# Patient Record
Sex: FEMALE | Race: WHITE | NOT HISPANIC OR LATINO | Employment: OTHER | URBAN - METROPOLITAN AREA
[De-identification: names, ages, dates, MRNs, and addresses within clinical notes are randomized per-mention and may not be internally consistent; named-entity substitution may affect disease eponyms.]

---

## 2017-12-12 DIAGNOSIS — Z12.31 SCREENING MAMMOGRAM, ENCOUNTER FOR: Primary | ICD-10-CM

## 2017-12-15 ENCOUNTER — HOSPITAL ENCOUNTER (OUTPATIENT)
Dept: RADIOLOGY | Facility: HOSPITAL | Age: 72
Discharge: HOME | End: 2017-12-15
Attending: FAMILY MEDICINE
Payer: MEDICARE

## 2017-12-15 DIAGNOSIS — Z12.31 SCREENING MAMMOGRAM, ENCOUNTER FOR: ICD-10-CM

## 2017-12-15 PROCEDURE — 77067 SCR MAMMO BI INCL CAD: CPT | Mod: 26,,, | Performed by: RADIOLOGY

## 2017-12-15 PROCEDURE — 77063 BREAST TOMOSYNTHESIS BI: CPT | Mod: 26,,, | Performed by: RADIOLOGY

## 2017-12-15 PROCEDURE — 77067 SCR MAMMO BI INCL CAD: CPT | Mod: TC

## 2018-09-27 ENCOUNTER — HOSPITAL ENCOUNTER (OUTPATIENT)
Dept: RADIOLOGY | Facility: HOSPITAL | Age: 73
Discharge: HOME | End: 2018-09-27
Attending: FAMILY MEDICINE
Payer: MEDICARE

## 2018-09-27 DIAGNOSIS — M25.571 RIGHT ANKLE PAIN: Primary | ICD-10-CM

## 2018-09-27 DIAGNOSIS — M79.671 RIGHT FOOT PAIN: ICD-10-CM

## 2018-09-27 DIAGNOSIS — M79.671 RIGHT FOOT PAIN: Primary | ICD-10-CM

## 2018-09-27 DIAGNOSIS — M25.571 RIGHT ANKLE PAIN: ICD-10-CM

## 2018-09-27 PROCEDURE — 73630 X-RAY EXAM OF FOOT: CPT | Mod: TC,FY,RT

## 2018-09-27 PROCEDURE — 73610 X-RAY EXAM OF ANKLE: CPT | Mod: TC,FY,RT

## 2018-09-27 PROCEDURE — 73610 X-RAY EXAM OF ANKLE: CPT | Mod: 26,RT,, | Performed by: RADIOLOGY

## 2018-09-27 PROCEDURE — 73630 X-RAY EXAM OF FOOT: CPT | Mod: 26,RT,, | Performed by: RADIOLOGY

## 2019-01-08 DIAGNOSIS — Z12.39 SCREENING BREAST EXAMINATION: Primary | ICD-10-CM

## 2019-01-10 ENCOUNTER — HOSPITAL ENCOUNTER (OUTPATIENT)
Dept: RADIOLOGY | Facility: HOSPITAL | Age: 74
Discharge: HOME | End: 2019-01-10
Attending: FAMILY MEDICINE
Payer: MEDICARE

## 2019-01-10 DIAGNOSIS — Z12.39 SCREENING BREAST EXAMINATION: ICD-10-CM

## 2019-01-10 PROCEDURE — 77067 SCR MAMMO BI INCL CAD: CPT | Mod: 26,,, | Performed by: RADIOLOGY

## 2019-01-10 PROCEDURE — 77067 MAMMO DIGITAL SCREENING BILAT WITH TOMOSYNTHESIS_CAD: ICD-10-PCS | Mod: 26,,, | Performed by: RADIOLOGY

## 2019-01-10 PROCEDURE — 77067 SCR MAMMO BI INCL CAD: CPT | Mod: TC

## 2019-01-10 PROCEDURE — 77063 BREAST TOMOSYNTHESIS BI: CPT | Mod: 26,,, | Performed by: RADIOLOGY

## 2019-01-10 PROCEDURE — 77063 MAMMO DIGITAL SCREENING BILAT WITH TOMOSYNTHESIS_CAD: ICD-10-PCS | Mod: 26,,, | Performed by: RADIOLOGY

## 2019-01-18 ENCOUNTER — HOSPITAL ENCOUNTER (OUTPATIENT)
Dept: RADIOLOGY | Facility: HOSPITAL | Age: 74
Discharge: HOME | End: 2019-01-18
Attending: PODIATRIST
Payer: MEDICARE

## 2019-01-18 ENCOUNTER — OFFICE VISIT (OUTPATIENT)
Dept: PODIATRY | Facility: CLINIC | Age: 74
End: 2019-01-18
Payer: MEDICARE

## 2019-01-18 VITALS
WEIGHT: 158 LBS | HEIGHT: 61 IN | DIASTOLIC BLOOD PRESSURE: 81 MMHG | HEART RATE: 79 BPM | BODY MASS INDEX: 29.83 KG/M2 | SYSTOLIC BLOOD PRESSURE: 133 MMHG

## 2019-01-18 DIAGNOSIS — M79.671 CHRONIC FOOT PAIN, RIGHT: Primary | ICD-10-CM

## 2019-01-18 DIAGNOSIS — M76.71 PERONEAL TENDONITIS, RIGHT: ICD-10-CM

## 2019-01-18 DIAGNOSIS — G89.29 CHRONIC FOOT PAIN, RIGHT: Primary | ICD-10-CM

## 2019-01-18 DIAGNOSIS — M77.41 METATARSALGIA OF RIGHT FOOT: ICD-10-CM

## 2019-01-18 DIAGNOSIS — M79.671 CHRONIC FOOT PAIN, RIGHT: ICD-10-CM

## 2019-01-18 DIAGNOSIS — G89.29 CHRONIC FOOT PAIN, RIGHT: ICD-10-CM

## 2019-01-18 PROBLEM — E03.9 HYPOTHYROIDISM: Status: ACTIVE | Noted: 2019-01-18

## 2019-01-18 PROBLEM — I10 HYPERTENSION: Status: ACTIVE | Noted: 2019-01-18

## 2019-01-18 PROCEDURE — 99203 OFFICE O/P NEW LOW 30 MIN: CPT | Mod: S$GLB,,, | Performed by: PODIATRIST

## 2019-01-18 PROCEDURE — 1101F PT FALLS ASSESS-DOCD LE1/YR: CPT | Mod: CPTII,S$GLB,, | Performed by: PODIATRIST

## 2019-01-18 PROCEDURE — 1101F PR PT FALLS ASSESS DOC 0-1 FALLS W/OUT INJ PAST YR: ICD-10-PCS | Mod: CPTII,S$GLB,, | Performed by: PODIATRIST

## 2019-01-18 PROCEDURE — 73630 X-RAY EXAM OF FOOT: CPT | Mod: TC,PN,RT

## 2019-01-18 PROCEDURE — 73630 X-RAY EXAM OF FOOT: CPT | Mod: 26,RT,, | Performed by: RADIOLOGY

## 2019-01-18 PROCEDURE — 73630 XR FOOT COMPLETE 3 VIEW RIGHT: ICD-10-PCS | Mod: 26,RT,, | Performed by: RADIOLOGY

## 2019-01-18 PROCEDURE — 99999 PR PBB SHADOW E&M-EST. PATIENT-LVL IV: CPT | Mod: PBBFAC,,, | Performed by: PODIATRIST

## 2019-01-18 PROCEDURE — 99999 PR PBB SHADOW E&M-EST. PATIENT-LVL IV: ICD-10-PCS | Mod: PBBFAC,,, | Performed by: PODIATRIST

## 2019-01-18 PROCEDURE — 99203 PR OFFICE/OUTPT VISIT, NEW, LEVL III, 30-44 MIN: ICD-10-PCS | Mod: S$GLB,,, | Performed by: PODIATRIST

## 2019-01-18 RX ORDER — MELOXICAM 15 MG/1
15 TABLET ORAL DAILY
Qty: 30 TABLET | Refills: 1 | Status: SHIPPED | OUTPATIENT
Start: 2019-01-18 | End: 2020-07-13

## 2019-01-18 NOTE — PROGRESS NOTES
"Subjective:      Patient ID: Agnieszka Trujillo is a 73 y.o. female.    Chief Complaint: Foot Pain (right foot )    Agnieszka Trujillo is a 73 y.o. female who  has a past medical history of Hypertension and Thyroid disease.    Patient complaining of right foot pain.  Points to right 5th met head and her fore foot.  States a year ago she fell and hit her right foot, and has pain ever since.  Complains of 10/10 achy pain is constant all day,a nd will hurt her at night too.  Reports some pins and tingling to toes 1 and 2.  Denies radiating pain.   States she will take an occasional tylenol or alieve for pain.    Vitals:    01/18/19 0757   BP: 133/81   Pulse: 79   Weight: 71.7 kg (158 lb)   Height: 5' 1" (1.549 m)   PainSc:   9      Past Medical History:   Diagnosis Date    Hypertension     Thyroid disease        Past Surgical History:   Procedure Laterality Date    BREAST BIOPSY Right     x2    HYSTERECTOMY      @28yrs of age    OOPHORECTOMY      @58yrs of age       Family History   Problem Relation Age of Onset    Diabetes Maternal Grandmother        Social History     Socioeconomic History    Marital status:      Spouse name: Not on file    Number of children: Not on file    Years of education: Not on file    Highest education level: Not on file   Social Needs    Financial resource strain: Not on file    Food insecurity - worry: Not on file    Food insecurity - inability: Not on file    Transportation needs - medical: Not on file    Transportation needs - non-medical: Not on file   Occupational History    Not on file   Tobacco Use    Smoking status: Never Smoker   Substance and Sexual Activity    Alcohol use: Yes     Alcohol/week: 0.0 oz    Drug use: No    Sexual activity: Not on file   Other Topics Concern    Not on file   Social History Narrative    Not on file       Current Outpatient Medications   Medication Sig Dispense Refill    aspirin 81 MG Chew Take 81 mg by mouth once daily.      " citalopram (CELEXA) 10 MG tablet       cyanocobalamin (VITAMIN B-12) 500 MCG tablet Take 500 mcg by mouth once daily.      desonide 0.05% (DESOWEN) 0.05 % Oint       fish oil-omega-3 fatty acids 300-1,000 mg capsule Take 2 g by mouth once daily.      levothyroxine (SYNTHROID) 100 MCG tablet Take 1 tablet by mouth once daily.      lisinopril-hydrochlorothiazide (PRINZIDE,ZESTORETIC) 20-25 mg Tab Take 1 tablet by mouth once daily.      meloxicam (MOBIC) 15 MG tablet Take 1 tablet (15 mg total) by mouth once daily. 30 tablet 1    simvastatin (ZOCOR) 40 MG tablet Take 1 tablet by mouth once daily.      trazodone (DESYREL) 100 MG tablet Take 1 tablet by mouth once daily.      vitamin D 1000 units Tab Take 185 mg by mouth once daily.      vitamin E 400 UNIT capsule Take 400 Units by mouth once daily.       No current facility-administered medications for this visit.        Review of patient's allergies indicates:  No Known Allergies        Review of Systems   Constitution: Negative for chills and fever.   HENT: Negative for congestion.    Cardiovascular: Negative for chest pain and cyanosis.   Respiratory: Negative for cough and shortness of breath.    Skin: Negative for color change, dry skin, poor wound healing, suspicious lesions and unusual hair distribution.   Musculoskeletal: Positive for falls and myalgias. Negative for joint pain, joint swelling and muscle weakness.   Gastrointestinal: Negative for nausea and vomiting.   Neurological: Positive for paresthesias. Negative for loss of balance, numbness, sensory change and tremors.   Psychiatric/Behavioral: Negative for altered mental status. The patient is not nervous/anxious.            Objective:      Physical Exam   Constitutional: She is oriented to person, place, and time. She appears well-developed and well-nourished. No distress.   HENT:   Head: Atraumatic.   Neck: No tracheal deviation present.   Cardiovascular: Intact distal pulses.   Pulses:        Dorsalis pedis pulses are 2+ on the right side, and 2+ on the left side.        Posterior tibial pulses are 2+ on the right side, and 2+ on the left side.   CFT< 3 secs all toes bilateral foot, skin temp warm bilateral foot, diminished digital hair growth bilateral foot, no lower extremity edema bilateral.     Musculoskeletal: She exhibits tenderness. She exhibits no edema or deformity.   Pain at right 5th met base, along peroneal tendons laterally.  Pain at 4th met head and intermetatarsal spaces.  Pain with forced ABDuction of foot.    Mild callapse of medial arch    Mild halluv valgus with medial prominance.    Otherwise, Normal angle, base, station of gait.  No pain to palpation bilateral lower extremities.  Range of motion, stability, muscle strength, and muscle tone age and health appropriate normal bilateral feet and legs.   Neurological: She is alert and oriented to person, place, and time. She has normal strength. She displays no atrophy. No sensory deficit.   Reflex Scores:       Patellar reflexes are 2+ on the right side and 2+ on the left side.       Achilles reflexes are 2+ on the right side and 2+ on the left side.  Negative tinels sign.  5/5 muscle strength.    Skin: Skin is warm, dry and intact. Capillary refill takes less than 2 seconds. No ecchymosis and no rash noted. She is not diaphoretic. No cyanosis or erythema. No pallor. Nails show no clubbing.   Otherwise no open wounds, interdigital macerations, or calluses.  .       Psychiatric: She has a normal mood and affect. Her behavior is normal.             Assessment:       Encounter Diagnoses   Name Primary?    Chronic foot pain, right Yes    Peroneal tendonitis, right     Metatarsalgia of right foot          Plan:       Agnieszka was seen today for foot pain.    Diagnoses and all orders for this visit:    Chronic foot pain, right  -     MRI Foot (Forefoot) Right Without Contrast; Future  -     X-Ray Foot Complete Right; Future    Peroneal  tendonitis, right  -     MRI Foot (Forefoot) Right Without Contrast; Future    Metatarsalgia of right foot  -     MRI Foot (Forefoot) Right Without Contrast; Future  -     X-Ray Foot Complete Right; Future    Other orders  -     meloxicam (MOBIC) 15 MG tablet; Take 1 tablet (15 mg total) by mouth once daily.      I counseled the patient on her conditions, their implications and medical management.    Dispensed short CAM boot to offload the foot. Patient related improvement with pain.    Ordered repeat x-ray and MRI to rule out stress fracture of fifth metatarsal.    Meloxicam for pain    RTC as specified    Assisted by Chay Ge, PGY2      I have personally taken the history and examined this patient and agree with the resident's note as stated as above.   Ramy Mcclellan DPM, FACFAS

## 2019-01-25 ENCOUNTER — HOSPITAL ENCOUNTER (OUTPATIENT)
Dept: RADIOLOGY | Facility: HOSPITAL | Age: 74
Discharge: HOME | End: 2019-01-25
Attending: PODIATRIST
Payer: MEDICARE

## 2019-01-25 DIAGNOSIS — M76.71 PERONEAL TENDONITIS, RIGHT: ICD-10-CM

## 2019-01-25 DIAGNOSIS — M79.671 CHRONIC FOOT PAIN, RIGHT: ICD-10-CM

## 2019-01-25 DIAGNOSIS — M77.41 METATARSALGIA OF RIGHT FOOT: ICD-10-CM

## 2019-01-25 DIAGNOSIS — G89.29 CHRONIC FOOT PAIN, RIGHT: ICD-10-CM

## 2019-01-25 PROCEDURE — 73718 MRI FOOT (FOREFOOT) RIGHT WITHOUT CONTRAST: ICD-10-PCS | Mod: 26,RT,, | Performed by: RADIOLOGY

## 2019-01-25 PROCEDURE — 73718 MRI LOWER EXTREMITY W/O DYE: CPT | Mod: TC,RT

## 2019-01-25 PROCEDURE — 73718 MRI LOWER EXTREMITY W/O DYE: CPT | Mod: 26,RT,, | Performed by: RADIOLOGY

## 2019-02-18 ENCOUNTER — OFFICE VISIT (OUTPATIENT)
Dept: PODIATRY | Facility: CLINIC | Age: 74
End: 2019-02-18
Payer: MEDICARE

## 2019-02-18 VITALS
HEIGHT: 61 IN | HEART RATE: 87 BPM | SYSTOLIC BLOOD PRESSURE: 124 MMHG | DIASTOLIC BLOOD PRESSURE: 72 MMHG | WEIGHT: 158 LBS | BODY MASS INDEX: 29.83 KG/M2

## 2019-02-18 DIAGNOSIS — G89.29 CHRONIC FOOT PAIN, RIGHT: Primary | ICD-10-CM

## 2019-02-18 DIAGNOSIS — G57.61 MORTON'S NEUROMA OF THIRD INTERSPACE OF RIGHT FOOT: ICD-10-CM

## 2019-02-18 DIAGNOSIS — M67.88 PERONEAL TENDINOSIS, RIGHT: ICD-10-CM

## 2019-02-18 DIAGNOSIS — M79.671 CHRONIC FOOT PAIN, RIGHT: Primary | ICD-10-CM

## 2019-02-18 DIAGNOSIS — M77.41 METATARSALGIA OF RIGHT FOOT: ICD-10-CM

## 2019-02-18 PROCEDURE — 99999 PR PBB SHADOW E&M-EST. PATIENT-LVL III: CPT | Mod: PBBFAC,,, | Performed by: PODIATRIST

## 2019-02-18 PROCEDURE — 99213 OFFICE O/P EST LOW 20 MIN: CPT | Mod: 25,S$GLB,, | Performed by: PODIATRIST

## 2019-02-18 PROCEDURE — 99213 PR OFFICE/OUTPT VISIT, EST, LEVL III, 20-29 MIN: ICD-10-PCS | Mod: 25,S$GLB,, | Performed by: PODIATRIST

## 2019-02-18 PROCEDURE — 64455 NJX AA&/STRD PLTR COM DG NRV: CPT | Mod: RT,S$GLB,, | Performed by: PODIATRIST

## 2019-02-18 PROCEDURE — 1101F PR PT FALLS ASSESS DOC 0-1 FALLS W/OUT INJ PAST YR: ICD-10-PCS | Mod: CPTII,S$GLB,, | Performed by: PODIATRIST

## 2019-02-18 PROCEDURE — 1101F PT FALLS ASSESS-DOCD LE1/YR: CPT | Mod: CPTII,S$GLB,, | Performed by: PODIATRIST

## 2019-02-18 PROCEDURE — 64455 PR INJECT ANES/STEROID PLANTAR COMMON DIGITAL NERVE: ICD-10-PCS | Mod: RT,S$GLB,, | Performed by: PODIATRIST

## 2019-02-18 PROCEDURE — 99999 PR PBB SHADOW E&M-EST. PATIENT-LVL III: ICD-10-PCS | Mod: PBBFAC,,, | Performed by: PODIATRIST

## 2019-02-18 RX ORDER — LOSARTAN POTASSIUM AND HYDROCHLOROTHIAZIDE 25; 100 MG/1; MG/1
TABLET ORAL
COMMUNITY
Start: 2018-12-24 | End: 2020-07-21

## 2019-02-18 RX ORDER — METHYLPREDNISOLONE ACETATE 40 MG/ML
40 INJECTION, SUSPENSION INTRA-ARTICULAR; INTRALESIONAL; INTRAMUSCULAR; SOFT TISSUE
Status: COMPLETED | OUTPATIENT
Start: 2019-02-18 | End: 2019-02-18

## 2019-02-18 RX ADMIN — METHYLPREDNISOLONE ACETATE 40 MG: 40 INJECTION, SUSPENSION INTRA-ARTICULAR; INTRALESIONAL; INTRAMUSCULAR; SOFT TISSUE at 11:02

## 2019-02-18 NOTE — PROGRESS NOTES
"Subjective:      Patient ID: Agnieszka Trujillo is a 73 y.o. female.    Chief Complaint: Follow-up    Agnieszka Trujillo is a 73 y.o. female who  has a past medical history of Hypertension and Thyroid disease.    Patient complaining of right foot pain.  Points to right 5th met head and her fore foot.  States a year ago she fell and hit her right foot, and has pain ever since.  Complains of 10/10 achy pain is constant all day,a nd will hurt her at night too.  Reports some pins and tingling to toes 1 and 2.  Denies radiating pain.   States she will take an occasional tylenol or alieve for pain.    2/18/19 Patient f/u right lateral foot pain.  states she has been wearing her boot which helps a lot, admits to wearing reagular shoes to [employer].  Still complaining of pain.  Denies trips, falls, or trauma. Overall pain has improved somewhat with wearing the boot.    Vitals:    02/18/19 1101   BP: 124/72   Pulse: 87   Weight: 71.7 kg (158 lb)   Height: 5' 1" (1.549 m)   PainSc: 0-No pain      Past Medical History:   Diagnosis Date    Hypertension     Thyroid disease        Past Surgical History:   Procedure Laterality Date    BREAST BIOPSY Right     x2    HYSTERECTOMY      @28yrs of age    OOPHORECTOMY      @58yrs of age       Family History   Problem Relation Age of Onset    Diabetes Maternal Grandmother        Social History     Socioeconomic History    Marital status:      Spouse name: None    Number of children: None    Years of education: None    Highest education level: None   Social Needs    Financial resource strain: None    Food insecurity - worry: None    Food insecurity - inability: None    Transportation needs - medical: None    Transportation needs - non-medical: None   Occupational History    None   Tobacco Use    Smoking status: Never Smoker   Substance and Sexual Activity    Alcohol use: Yes     Alcohol/week: 0.0 oz    Drug use: No    Sexual activity: None   Other Topics Concern    None "   Social History Narrative    None       Current Outpatient Medications   Medication Sig Dispense Refill    aspirin 81 MG Chew Take 81 mg by mouth once daily.      citalopram (CELEXA) 10 MG tablet       cyanocobalamin (VITAMIN B-12) 500 MCG tablet Take 500 mcg by mouth once daily.      desonide 0.05% (DESOWEN) 0.05 % Oint       fish oil-omega-3 fatty acids 300-1,000 mg capsule Take 2 g by mouth once daily.      levothyroxine (SYNTHROID) 100 MCG tablet Take 1 tablet by mouth once daily.      lisinopril-hydrochlorothiazide (PRINZIDE,ZESTORETIC) 20-25 mg Tab Take 1 tablet by mouth once daily.      losartan-hydrochlorothiazide 100-25 mg (HYZAAR) 100-25 mg per tablet       meloxicam (MOBIC) 15 MG tablet Take 1 tablet (15 mg total) by mouth once daily. 30 tablet 1    simvastatin (ZOCOR) 40 MG tablet Take 1 tablet by mouth once daily.      trazodone (DESYREL) 100 MG tablet Take 1 tablet by mouth once daily.      vitamin D 1000 units Tab Take 185 mg by mouth once daily.      vitamin E 400 UNIT capsule Take 400 Units by mouth once daily.       No current facility-administered medications for this visit.        Review of patient's allergies indicates:  No Known Allergies        Review of Systems   Constitution: Negative for chills and fever.   HENT: Negative for congestion.    Cardiovascular: Negative for chest pain and cyanosis.   Respiratory: Negative for cough and shortness of breath.    Skin: Negative for color change, dry skin, poor wound healing, suspicious lesions and unusual hair distribution.   Musculoskeletal: Positive for falls and myalgias. Negative for joint pain, joint swelling and muscle weakness.   Gastrointestinal: Negative for nausea and vomiting.   Neurological: Positive for paresthesias. Negative for loss of balance, numbness, sensory change and tremors.   Psychiatric/Behavioral: Negative for altered mental status. The patient is not nervous/anxious.            Objective:      Physical Exam    Constitutional: She is oriented to person, place, and time. She appears well-developed and well-nourished. No distress.   HENT:   Head: Atraumatic.   Neck: No tracheal deviation present.   Cardiovascular: Intact distal pulses.   Pulses:       Dorsalis pedis pulses are 2+ on the right side, and 2+ on the left side.        Posterior tibial pulses are 2+ on the right side, and 2+ on the left side.   CFT< 3 secs all toes bilateral foot, skin temp warm bilateral foot, diminished digital hair growth bilateral foot, no lower extremity edema bilateral.     Musculoskeletal: She exhibits tenderness. She exhibits no edema or deformity.   Pain at right 5th met base, and metatarsal shaft, along peroneal tendons laterally.  Pain at 4th met head and intermetatarsal spaces 3,4 right foot.  Pain with forced ABDuction of foot.    Mild halluv valgus with medial prominance.    FF varus with excessive supination   Neurological: She is alert and oriented to person, place, and time. She has normal strength. She displays no atrophy. No sensory deficit.   Reflex Scores:       Patellar reflexes are 2+ on the right side and 2+ on the left side.       Achilles reflexes are 2+ on the right side and 2+ on the left side.  Negative tinels sign.  5/5 muscle strength.    Skin: Skin is warm, dry and intact. Capillary refill takes less than 2 seconds. No ecchymosis and no rash noted. She is not diaphoretic. No cyanosis or erythema. No pallor. Nails show no clubbing.   Otherwise no open wounds, interdigital macerations, or calluses.  .       Psychiatric: She has a normal mood and affect. Her behavior is normal.             Assessment:       Encounter Diagnoses   Name Primary?   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    Discussed x-ray and MRI results with patient.  Imaging negative for fracutre, positive for edema in peroneal tendons.    -After obtaining verbal consent, the Right 3 rd and fourth distal metatarsal interspaces were prepped and an injection of 1mL 0.5%Marcaine plaine, 40mg Methypredisolone,    was given. Patient instructed to note duration, location and quality of the pain following injection.    -WBAT in boot, patient educated not to bear weight without it    RTC as specified    Assisted by Chay Ge, PGY2    I have personally taken the history and examined this patient and agree with the resident's note as stated as above.   Ramy Mcclellan DPM, FACFAS    Reviewed MRI noting peroneal tendinosis, no fracture or dislocation noted.    Clinically patient has lateral column overload secondary to only partially compensated FF varus right foot. Continue offloading with orthopedic boot.

## 2019-03-22 ENCOUNTER — OFFICE VISIT (OUTPATIENT)
Dept: PODIATRY | Facility: CLINIC | Age: 74
End: 2019-03-22
Payer: MEDICARE

## 2019-03-22 VITALS — HEIGHT: 61 IN | BODY MASS INDEX: 29.83 KG/M2 | WEIGHT: 158 LBS

## 2019-03-22 DIAGNOSIS — M79.671 CHRONIC FOOT PAIN, RIGHT: Primary | ICD-10-CM

## 2019-03-22 DIAGNOSIS — M77.41 METATARSALGIA OF RIGHT FOOT: ICD-10-CM

## 2019-03-22 DIAGNOSIS — M67.88 PERONEAL TENDINOSIS, RIGHT: ICD-10-CM

## 2019-03-22 DIAGNOSIS — G89.29 CHRONIC FOOT PAIN, RIGHT: Primary | ICD-10-CM

## 2019-03-22 PROCEDURE — 99999 PR PBB SHADOW E&M-EST. PATIENT-LVL III: CPT | Mod: PBBFAC,,, | Performed by: PODIATRIST

## 2019-03-22 PROCEDURE — 99999 PR PBB SHADOW E&M-EST. PATIENT-LVL III: ICD-10-PCS | Mod: PBBFAC,,, | Performed by: PODIATRIST

## 2019-03-22 PROCEDURE — 1101F PR PT FALLS ASSESS DOC 0-1 FALLS W/OUT INJ PAST YR: ICD-10-PCS | Mod: CPTII,S$GLB,, | Performed by: PODIATRIST

## 2019-03-22 PROCEDURE — 99213 OFFICE O/P EST LOW 20 MIN: CPT | Mod: 25,S$GLB,, | Performed by: PODIATRIST

## 2019-03-22 PROCEDURE — 99213 PR OFFICE/OUTPT VISIT, EST, LEVL III, 20-29 MIN: ICD-10-PCS | Mod: 25,S$GLB,, | Performed by: PODIATRIST

## 2019-03-22 PROCEDURE — 20550 NJX 1 TENDON SHEATH/LIGAMENT: CPT | Mod: RT,S$GLB,, | Performed by: PODIATRIST

## 2019-03-22 PROCEDURE — 20550 PR INJECT TENDON SHEATH/LIGAMENT: ICD-10-PCS | Mod: RT,S$GLB,, | Performed by: PODIATRIST

## 2019-03-22 PROCEDURE — 1101F PT FALLS ASSESS-DOCD LE1/YR: CPT | Mod: CPTII,S$GLB,, | Performed by: PODIATRIST

## 2019-03-22 RX ORDER — METHYLPREDNISOLONE ACETATE 40 MG/ML
40 INJECTION, SUSPENSION INTRA-ARTICULAR; INTRALESIONAL; INTRAMUSCULAR; SOFT TISSUE
Status: COMPLETED | OUTPATIENT
Start: 2019-03-22 | End: 2019-03-22

## 2019-03-22 RX ADMIN — METHYLPREDNISOLONE ACETATE 40 MG: 40 INJECTION, SUSPENSION INTRA-ARTICULAR; INTRALESIONAL; INTRAMUSCULAR; SOFT TISSUE at 10:03

## 2019-03-23 NOTE — PROGRESS NOTES
"Subjective:      Patient ID: Agnieszka Trujillo is a 73 y.o. female.    Chief Complaint: Follow-up (right foot pain )    Agnieszka Trujillo is a 73 y.o. female who  has a past medical history of Hypertension and Thyroid disease.    Patient complaining of right foot pain.  Points to right 5th met head and her fore foot.  States a year ago she fell and hit her right foot, and has pain ever since.  Complains of 10/10 achy pain is constant all day,a nd will hurt her at night too.  Reports some pins and tingling to toes 1 and 2.  Denies radiating pain.   States she will take an occasional tylenol or alieve for pain.    2/18/19 Patient f/u right lateral foot pain.  states she has been wearing her boot which helps a lot, admits to wearing reagular shoes to [employer].  Still complaining of pain.  Denies trips, falls, or trauma. Overall pain has improved somewhat with wearing the boot.    3/22/19:  Follow-up visit for right foot and ankle pain offloaded in Cam Walker boot.  Says her pain is much better with the boot however when she tries to walk without it she has moderate pain.  No longer has any pain under the ball of foot after the steroid injection.  Point severe dorsal lateral midfoot indicating region of most pain.    Vitals:    03/22/19 0940   Weight: 71.7 kg (158 lb)   Height: 5' 1" (1.549 m)   PainSc:   8      Past Medical History:   Diagnosis Date    Hypertension     Thyroid disease        Past Surgical History:   Procedure Laterality Date    BREAST BIOPSY Right     x2    HYSTERECTOMY      @28yrs of age    OOPHORECTOMY      @58yrs of age       Family History   Problem Relation Age of Onset    Diabetes Maternal Grandmother        Social History     Socioeconomic History    Marital status:      Spouse name: Not on file    Number of children: Not on file    Years of education: Not on file    Highest education level: Not on file   Social Needs    Financial resource strain: Not on file    Food insecurity - worry: " Not on file    Food insecurity - inability: Not on file    Transportation needs - medical: Not on file    Transportation needs - non-medical: Not on file   Occupational History    Not on file   Tobacco Use    Smoking status: Never Smoker   Substance and Sexual Activity    Alcohol use: Yes     Alcohol/week: 0.0 oz    Drug use: No    Sexual activity: Not on file   Other Topics Concern    Not on file   Social History Narrative    Not on file       Current Outpatient Medications   Medication Sig Dispense Refill    aspirin 81 MG Chew Take 81 mg by mouth once daily.      citalopram (CELEXA) 10 MG tablet       cyanocobalamin (VITAMIN B-12) 500 MCG tablet Take 500 mcg by mouth once daily.      desonide 0.05% (DESOWEN) 0.05 % Oint       fish oil-omega-3 fatty acids 300-1,000 mg capsule Take 2 g by mouth once daily.      levothyroxine (SYNTHROID) 100 MCG tablet Take 1 tablet by mouth once daily.      lisinopril-hydrochlorothiazide (PRINZIDE,ZESTORETIC) 20-25 mg Tab Take 1 tablet by mouth once daily.      losartan-hydrochlorothiazide 100-25 mg (HYZAAR) 100-25 mg per tablet       meloxicam (MOBIC) 15 MG tablet Take 1 tablet (15 mg total) by mouth once daily. 30 tablet 1    simvastatin (ZOCOR) 40 MG tablet Take 1 tablet by mouth once daily.      trazodone (DESYREL) 100 MG tablet Take 1 tablet by mouth once daily.      vitamin D 1000 units Tab Take 185 mg by mouth once daily.      vitamin E 400 UNIT capsule Take 400 Units by mouth once daily.       No current facility-administered medications for this visit.        Review of patient's allergies indicates:  No Known Allergies        Review of Systems   Constitution: Negative for chills and fever.   HENT: Negative for congestion.    Cardiovascular: Negative for chest pain and cyanosis.   Respiratory: Negative for cough and shortness of breath.    Skin: Negative for color change, dry skin, poor wound healing, suspicious lesions and unusual hair distribution.    Musculoskeletal: Positive for falls and myalgias. Negative for joint pain, joint swelling and muscle weakness.   Gastrointestinal: Negative for nausea and vomiting.   Neurological: Positive for paresthesias. Negative for loss of balance, numbness, sensory change and tremors.   Psychiatric/Behavioral: Negative for altered mental status. The patient is not nervous/anxious.            Objective:      Physical Exam   Constitutional: She is oriented to person, place, and time. She appears well-developed and well-nourished. No distress.   HENT:   Head: Atraumatic.   Neck: No tracheal deviation present.   Cardiovascular: Intact distal pulses.   Pulses:       Dorsalis pedis pulses are 2+ on the right side, and 2+ on the left side.        Posterior tibial pulses are 2+ on the right side, and 2+ on the left side.   CFT< 3 secs all toes bilateral foot, skin temp warm bilateral foot, diminished digital hair growth bilateral foot, no lower extremity edema bilateral.     Musculoskeletal: She exhibits tenderness. She exhibits no edema or deformity.   Pain overlying the peroneal tendons at the level of the anterior calcaneus extending to the 5th metatarsal base right foot.  Pain palpated to the extensor digitorum brevis muscle belly right foot .  No pain with dorsiflexion of the toes. Slight to mild pain with active resisted eversion and plantar flexion eversion of the right foot.    Mild halluv valgus with medial prominance.    FF varus with excessive supination   Neurological: She is alert and oriented to person, place, and time. She has normal strength. She displays no atrophy. No sensory deficit.   Reflex Scores:       Patellar reflexes are 2+ on the right side and 2+ on the left side.       Achilles reflexes are 2+ on the right side and 2+ on the left side.  Negative tinels sign.  5/5 muscle strength.    Skin: Skin is warm, dry and intact. Capillary refill takes less than 2 seconds. No ecchymosis and no rash noted. She is  not diaphoretic. No cyanosis or erythema. No pallor. Nails show no clubbing.   Otherwise no open wounds, interdigital macerations, or calluses.  .       Psychiatric: She has a normal mood and affect. Her behavior is normal.             Assessment:       Encounter Diagnoses   Name Primary?    Chronic foot pain, right Yes    Peroneal tendinosis, right     Metatarsalgia of right foot          Plan:       Agnieszka was seen today for follow-up.    Diagnoses and all orders for this visit:    Chronic foot pain, right    Peroneal tendinosis, right    Metatarsalgia of right foot    Other orders  -     methylPREDNISolone acetate injection 40 mg      I counseled the patient on her conditions, their implications and medical management.    Discussed x-ray and MRI results with patient.  Imaging negative for fracutre, positive for edema in peroneal tendons.    Reviewed MRI noting peroneal tendinosis, no fracture or dislocation noted.    Clinically patient has lateral column overload secondary to only partially compensated FF varus right foot. Patient elects to continue with offloading using orthopedic boot.     Discussed with patient that she may require surgical intervention to repair the peroneal tendons.    Patient requesting another steroid injection to the right foot today to help alleviate her pain.  Skin overlying the extensor digitorum brevis muscle belly was prepped with alcohol.  A mixture of 40 mg Depo-Medrol and 1 mL of 0.25% plain Marcaine was injected into the extension and extensor digitorum brevis muscle belly.  She she tolerated procedure well without any apparent complications.  Instructed to rest ice and elevate.  Relates to having some relief of her pain following the injection.    Return to clinic within 4-6 weeks to assess progress.

## 2019-06-10 ENCOUNTER — OFFICE VISIT (OUTPATIENT)
Dept: PODIATRY | Facility: CLINIC | Age: 74
End: 2019-06-10
Payer: MEDICARE

## 2019-06-10 VITALS
SYSTOLIC BLOOD PRESSURE: 115 MMHG | HEIGHT: 61 IN | BODY MASS INDEX: 29.83 KG/M2 | DIASTOLIC BLOOD PRESSURE: 73 MMHG | HEART RATE: 93 BPM | WEIGHT: 158 LBS

## 2019-06-10 DIAGNOSIS — M67.88 PERONEAL TENDINOSIS, RIGHT: Primary | ICD-10-CM

## 2019-06-10 DIAGNOSIS — G89.29 CHRONIC FOOT PAIN, RIGHT: ICD-10-CM

## 2019-06-10 DIAGNOSIS — M79.671 CHRONIC FOOT PAIN, RIGHT: ICD-10-CM

## 2019-06-10 PROCEDURE — 99499 NO LOS: ICD-10-PCS | Mod: S$GLB,,, | Performed by: PODIATRIST

## 2019-06-10 PROCEDURE — 99999 PR PBB SHADOW E&M-EST. PATIENT-LVL III: CPT | Mod: PBBFAC,,, | Performed by: PODIATRIST

## 2019-06-10 PROCEDURE — 99999 PR PBB SHADOW E&M-EST. PATIENT-LVL III: ICD-10-PCS | Mod: PBBFAC,,, | Performed by: PODIATRIST

## 2019-06-10 PROCEDURE — 99499 UNLISTED E&M SERVICE: CPT | Mod: S$GLB,,, | Performed by: PODIATRIST

## 2019-06-10 PROCEDURE — 20550 NJX 1 TENDON SHEATH/LIGAMENT: CPT | Mod: RT,S$GLB,, | Performed by: PODIATRIST

## 2019-06-10 PROCEDURE — 20550 PR INJECT TENDON SHEATH/LIGAMENT: ICD-10-PCS | Mod: RT,S$GLB,, | Performed by: PODIATRIST

## 2019-06-10 RX ORDER — METHYLPREDNISOLONE ACETATE 40 MG/ML
40 INJECTION, SUSPENSION INTRA-ARTICULAR; INTRALESIONAL; INTRAMUSCULAR; SOFT TISSUE
Status: COMPLETED | OUTPATIENT
Start: 2019-06-10 | End: 2019-06-10

## 2019-06-10 RX ADMIN — METHYLPREDNISOLONE ACETATE 40 MG: 40 INJECTION, SUSPENSION INTRA-ARTICULAR; INTRALESIONAL; INTRAMUSCULAR; SOFT TISSUE at 11:06

## 2019-06-11 NOTE — PROGRESS NOTES
"Subjective:      Patient ID: Agnieszka Trujillo is a 74 y.o. female.    Chief Complaint: Follow-up (right foot pain on side of foot )    Agnieszka Trujillo is a 74 y.o. female who  has a past medical history of Hypertension and Thyroid disease.    Patient complaining of right foot pain.  Points to right 5th met head and her fore foot.  States a year ago she fell and hit her right foot, and has pain ever since.  Complains of 10/10 achy pain is constant all day,a nd will hurt her at night too.  Reports some pins and tingling to toes 1 and 2.  Denies radiating pain.   States she will take an occasional tylenol or alieve for pain.    2/18/19 Patient f/u right lateral foot pain.  states she has been wearing her boot which helps a lot, admits to wearing reagular shoes to [employer].  Still complaining of pain.  Denies trips, falls, or trauma. Overall pain has improved somewhat with wearing the boot.    3/22/19:  Follow-up visit for right foot and ankle pain offloaded in Cam Walker boot.  Says her pain is much better with the boot however when she tries to walk without it she has moderate pain.  No longer has any pain under the ball of foot after the steroid injection.  Point severe dorsal lateral midfoot indicating region of most pain.    6/10/19: Follow up for right foot pain. Says overall she has only mild pian if she wears the boot. If persists at the lateral midfoot pointing to the styloid process region. Previous steroid injection helped considerably. No recurrence of pain under ball of the foot.    Vitals:    06/10/19 1045   BP: 115/73   Pulse: 93   Weight: 71.7 kg (158 lb)   Height: 5' 1" (1.549 m)   PainSc:   8      Past Medical History:   Diagnosis Date    Hypertension     Thyroid disease        Past Surgical History:   Procedure Laterality Date    BREAST BIOPSY Right     x2    HYSTERECTOMY      @28yrs of age    OOPHORECTOMY      @58yrs of age       Family History   Problem Relation Age of Onset    Diabetes Maternal " Grandmother        Social History     Socioeconomic History    Marital status:      Spouse name: Not on file    Number of children: Not on file    Years of education: Not on file    Highest education level: Not on file   Occupational History    Not on file   Social Needs    Financial resource strain: Not on file    Food insecurity:     Worry: Not on file     Inability: Not on file    Transportation needs:     Medical: Not on file     Non-medical: Not on file   Tobacco Use    Smoking status: Never Smoker   Substance and Sexual Activity    Alcohol use: Yes     Alcohol/week: 0.0 oz    Drug use: No    Sexual activity: Not on file   Lifestyle    Physical activity:     Days per week: Not on file     Minutes per session: Not on file    Stress: Not on file   Relationships    Social connections:     Talks on phone: Not on file     Gets together: Not on file     Attends [employer] service: Not on file     Active member of club or organization: Not on file     Attends meetings of clubs or organizations: Not on file     Relationship status: Not on file   Other Topics Concern    Not on file   Social History Narrative    Not on file       Current Outpatient Medications   Medication Sig Dispense Refill    aspirin 81 MG Chew Take 81 mg by mouth once daily.      citalopram (CELEXA) 10 MG tablet       cyanocobalamin (VITAMIN B-12) 500 MCG tablet Take 500 mcg by mouth once daily.      desonide 0.05% (DESOWEN) 0.05 % Oint       fish oil-omega-3 fatty acids 300-1,000 mg capsule Take 2 g by mouth once daily.      levothyroxine (SYNTHROID) 100 MCG tablet Take 1 tablet by mouth once daily.      lisinopril-hydrochlorothiazide (PRINZIDE,ZESTORETIC) 20-25 mg Tab Take 1 tablet by mouth once daily.      losartan-hydrochlorothiazide 100-25 mg (HYZAAR) 100-25 mg per tablet       meloxicam (MOBIC) 15 MG tablet Take 1 tablet (15 mg total) by mouth once daily. 30 tablet 1    simvastatin (ZOCOR) 40 MG tablet Take 1  tablet by mouth once daily.      trazodone (DESYREL) 100 MG tablet Take 1 tablet by mouth once daily.      vitamin D 1000 units Tab Take 185 mg by mouth once daily.      vitamin E 400 UNIT capsule Take 400 Units by mouth once daily.       No current facility-administered medications for this visit.        Review of patient's allergies indicates:  No Known Allergies        Review of Systems   Constitution: Negative for chills and fever.   HENT: Negative for congestion.    Cardiovascular: Negative for chest pain and cyanosis.   Respiratory: Negative for cough and shortness of breath.    Skin: Negative for color change, dry skin, poor wound healing, suspicious lesions and unusual hair distribution.   Musculoskeletal: Positive for falls and myalgias. Negative for joint pain, joint swelling and muscle weakness.   Gastrointestinal: Negative for nausea and vomiting.   Neurological: Positive for paresthesias. Negative for loss of balance, numbness, sensory change and tremors.   Psychiatric/Behavioral: Negative for altered mental status. The patient is not nervous/anxious.            Objective:      Physical Exam   Constitutional: She is oriented to person, place, and time. She appears well-developed and well-nourished. No distress.   HENT:   Head: Atraumatic.   Neck: No tracheal deviation present.   Cardiovascular: Intact distal pulses.   Pulses:       Dorsalis pedis pulses are 2+ on the right side, and 2+ on the left side.        Posterior tibial pulses are 2+ on the right side, and 2+ on the left side.   CFT< 3 secs all toes bilateral foot, skin temp warm bilateral foot, diminished digital hair growth bilateral foot, no lower extremity edema bilateral.     Musculoskeletal: She exhibits tenderness. She exhibits no edema or deformity.   Pain overlying the peroneal tendons at the level of the at attachment onto the base of the fifth metatarsal right foot.  No pain overlying the EDB muscle belly right foot .  No pain with  dorsiflexion of the toes. Pain with eversion right foot.    Mild halluv valgus with medial prominance.    FF varus with excessive supination   Neurological: She is alert and oriented to person, place, and time. She has normal strength. She displays no atrophy. No sensory deficit.   Reflex Scores:       Patellar reflexes are 2+ on the right side and 2+ on the left side.       Achilles reflexes are 2+ on the right side and 2+ on the left side.  Negative tinels sign.  5/5 muscle strength.    Skin: Skin is warm, dry and intact. Capillary refill takes less than 2 seconds. No ecchymosis and no rash noted. She is not diaphoretic. No cyanosis or erythema. No pallor. Nails show no clubbing.   Otherwise no open wounds, interdigital macerations, or calluses.  .       Psychiatric: She has a normal mood and affect. Her behavior is normal.             Assessment:       Encounter Diagnoses   Name Primary?    Peroneal tendinosis, right Yes    Chronic foot pain, right          Plan:       Agnieszka was seen today for follow-up.    Diagnoses and all orders for this visit:    Peroneal tendinosis, right    Chronic foot pain, right    Other orders  -     methylPREDNISolone acetate injection 40 mg      I counseled the patient on her conditions, their implications and medical management.    Discussed x-ray and MRI results with patient.  Imaging negative for fracutre, positive for edema in peroneal tendons.    Reviewed MRI noting peroneal tendinosis, no fracture or dislocation noted.    Clinically patient has lateral column overload secondary to only partially compensated FF varus right foot. Patient elects to continue with offloading using orthopedic boot.     Discussed with patient that she may require surgical intervention to repair the peroneal tendons consisting of possible vs permanent bracing. Discussed further injections could lead to peroneal tendon rupture.    Patient requesting another steroid injection to the right foot today to  help alleviate her pain.  Skin overlying the the distal peroneal tendons was prepped.  A mixture of 40 mg Depo-Medrol and 1 mL of 0.25% plain Marcaine was injected.  She she tolerated procedure well without any apparent complications.  Instructed to rest ice and elevate.  Relates to having some relief of her pain following the injection. Continue offloading with orthopedic boot.    Return to clinic within 4-6 weeks to assess progress.

## 2019-07-22 ENCOUNTER — OFFICE VISIT (OUTPATIENT)
Dept: PODIATRY | Facility: CLINIC | Age: 74
End: 2019-07-22
Payer: MEDICARE

## 2019-07-22 VITALS
WEIGHT: 158 LBS | SYSTOLIC BLOOD PRESSURE: 128 MMHG | DIASTOLIC BLOOD PRESSURE: 82 MMHG | BODY MASS INDEX: 29.83 KG/M2 | HEIGHT: 61 IN | HEART RATE: 68 BPM

## 2019-07-22 DIAGNOSIS — M77.41 METATARSALGIA OF RIGHT FOOT: ICD-10-CM

## 2019-07-22 DIAGNOSIS — M67.88 PERONEAL TENDINOSIS, RIGHT: Primary | ICD-10-CM

## 2019-07-22 DIAGNOSIS — M79.671 CHRONIC FOOT PAIN, RIGHT: ICD-10-CM

## 2019-07-22 DIAGNOSIS — G89.29 CHRONIC FOOT PAIN, RIGHT: ICD-10-CM

## 2019-07-22 PROCEDURE — 99999 PR PBB SHADOW E&M-EST. PATIENT-LVL III: CPT | Mod: PBBFAC,,, | Performed by: PODIATRIST

## 2019-07-22 PROCEDURE — 99212 PR OFFICE/OUTPT VISIT, EST, LEVL II, 10-19 MIN: ICD-10-PCS | Mod: S$GLB,,, | Performed by: PODIATRIST

## 2019-07-22 PROCEDURE — 99999 PR PBB SHADOW E&M-EST. PATIENT-LVL III: ICD-10-PCS | Mod: PBBFAC,,, | Performed by: PODIATRIST

## 2019-07-22 PROCEDURE — 1101F PR PT FALLS ASSESS DOC 0-1 FALLS W/OUT INJ PAST YR: ICD-10-PCS | Mod: CPTII,S$GLB,, | Performed by: PODIATRIST

## 2019-07-22 PROCEDURE — 1101F PT FALLS ASSESS-DOCD LE1/YR: CPT | Mod: CPTII,S$GLB,, | Performed by: PODIATRIST

## 2019-07-22 PROCEDURE — 99212 OFFICE O/P EST SF 10 MIN: CPT | Mod: S$GLB,,, | Performed by: PODIATRIST

## 2019-07-22 NOTE — PROGRESS NOTES
"Subjective:      Patient ID: Agnieszka Trujillo is a 74 y.o. female.    Chief Complaint: Follow-up (right foot )    Agnieszka Trujillo is a 74 y.o. female who  has a past medical history of Hypertension and Thyroid disease.    Patient complaining of right foot pain.  Points to right 5th met head and her fore foot.  States a year ago she fell and hit her right foot, and has pain ever since.  Complains of 10/10 achy pain is constant all day,a nd will hurt her at night too.  Reports some pins and tingling to toes 1 and 2.  Denies radiating pain.   States she will take an occasional tylenol or alieve for pain.    2/18/19 Patient f/u right lateral foot pain.  states she has been wearing her boot which helps a lot, admits to wearing reagular shoes to [employer].  Still complaining of pain.  Denies trips, falls, or trauma. Overall pain has improved somewhat with wearing the boot.    3/22/19:  Follow-up visit for right foot and ankle pain offloaded in Cam Walker boot.  Says her pain is much better with the boot however when she tries to walk without it she has moderate pain.  No longer has any pain under the ball of foot after the steroid injection.  Point severe dorsal lateral midfoot indicating region of most pain.    6/10/19: Follow up for right foot pain. Says overall she has only mild pian if she wears the boot. If persists at the lateral midfoot pointing to the styloid process region. Previous steroid injection helped considerably. No recurrence of pain under ball of the foot.    07/22/2019:  Follow-up from steroid injection to the right foot. Relates that she had moderate relief in pain and has mild pain in comparison today.  She would like to begin transitioning over orthopedic boot.  No other complaints noted today.    Vitals:    07/22/19 1055   BP: 128/82   Pulse: 68   Weight: 71.7 kg (158 lb)   Height: 5' 1" (1.549 m)   PainSc:   2      Past Medical History:   Diagnosis Date    Hypertension     Thyroid disease        Past " Surgical History:   Procedure Laterality Date    BREAST BIOPSY Right     x2    HYSTERECTOMY      @28yrs of age    OOPHORECTOMY      @58yrs of age       Family History   Problem Relation Age of Onset    Diabetes Maternal Grandmother        Social History     Socioeconomic History    Marital status:      Spouse name: Not on file    Number of children: Not on file    Years of education: Not on file    Highest education level: Not on file   Occupational History    Not on file   Social Needs    Financial resource strain: Not on file    Food insecurity:     Worry: Not on file     Inability: Not on file    Transportation needs:     Medical: Not on file     Non-medical: Not on file   Tobacco Use    Smoking status: Never Smoker   Substance and Sexual Activity    Alcohol use: Yes     Alcohol/week: 0.0 oz    Drug use: No    Sexual activity: Not on file   Lifestyle    Physical activity:     Days per week: Not on file     Minutes per session: Not on file    Stress: Not on file   Relationships    Social connections:     Talks on phone: Not on file     Gets together: Not on file     Attends Scientologist service: Not on file     Active member of club or organization: Not on file     Attends meetings of clubs or organizations: Not on file     Relationship status: Not on file   Other Topics Concern    Not on file   Social History Narrative    Not on file       Current Outpatient Medications   Medication Sig Dispense Refill    aspirin 81 MG Chew Take 81 mg by mouth once daily.      citalopram (CELEXA) 10 MG tablet       cyanocobalamin (VITAMIN B-12) 500 MCG tablet Take 500 mcg by mouth once daily.      desonide 0.05% (DESOWEN) 0.05 % Oint       fish oil-omega-3 fatty acids 300-1,000 mg capsule Take 2 g by mouth once daily.      levothyroxine (SYNTHROID) 100 MCG tablet Take 1 tablet by mouth once daily.      lisinopril-hydrochlorothiazide (PRINZIDE,ZESTORETIC) 20-25 mg Tab Take 1 tablet by mouth once  daily.      losartan-hydrochlorothiazide 100-25 mg (HYZAAR) 100-25 mg per tablet       meloxicam (MOBIC) 15 MG tablet Take 1 tablet (15 mg total) by mouth once daily. 30 tablet 1    simvastatin (ZOCOR) 40 MG tablet Take 1 tablet by mouth once daily.      trazodone (DESYREL) 100 MG tablet Take 1 tablet by mouth once daily.      vitamin D 1000 units Tab Take 185 mg by mouth once daily.      vitamin E 400 UNIT capsule Take 400 Units by mouth once daily.       No current facility-administered medications for this visit.        Review of patient's allergies indicates:  No Known Allergies        Review of Systems   Constitution: Negative for chills and fever.   HENT: Negative for congestion.    Cardiovascular: Negative for chest pain and cyanosis.   Respiratory: Negative for cough and shortness of breath.    Skin: Negative for color change, dry skin, poor wound healing, suspicious lesions and unusual hair distribution.   Musculoskeletal: Positive for falls and myalgias. Negative for joint pain, joint swelling and muscle weakness.   Gastrointestinal: Negative for nausea and vomiting.   Neurological: Positive for paresthesias. Negative for loss of balance, numbness, sensory change and tremors.   Psychiatric/Behavioral: Negative for altered mental status. The patient is not nervous/anxious.            Objective:      Physical Exam   Constitutional: She is oriented to person, place, and time. She appears well-developed and well-nourished. No distress.   HENT:   Head: Atraumatic.   Neck: No tracheal deviation present.   Cardiovascular: Intact distal pulses.   Pulses:       Dorsalis pedis pulses are 2+ on the right side, and 2+ on the left side.        Posterior tibial pulses are 2+ on the right side, and 2+ on the left side.   CFT< 3 secs all toes bilateral foot, skin temp warm bilateral foot, diminished digital hair growth bilateral foot, no lower extremity edema bilateral.     Musculoskeletal: She exhibits tenderness.  She exhibits no edema or deformity.   Mild pain on palpation at the base of the 5th metatarsal and attachment of the peroneus brevis region.  No localized edema or discoloration.  Mild pain on palpation overlying ED be muscle belly and cuboid metatarsal cuneiform articulations right foot. No pain with midfoot range of motion noted today.    Mild halluv valgus with medial prominance.    FF varus with excessive supination   Neurological: She is alert and oriented to person, place, and time. She has normal strength. She displays no atrophy. No sensory deficit.   Reflex Scores:       Patellar reflexes are 2+ on the right side and 2+ on the left side.       Achilles reflexes are 2+ on the right side and 2+ on the left side.  Negative tinels sign.  5/5 muscle strength.    Skin: Skin is warm, dry and intact. Capillary refill takes less than 2 seconds. No ecchymosis and no rash noted. She is not diaphoretic. No cyanosis or erythema. No pallor. Nails show no clubbing.   Otherwise no open wounds, interdigital macerations, or calluses.  .       Psychiatric: She has a normal mood and affect. Her behavior is normal.             Assessment:       Encounter Diagnoses   Name Primary?    Peroneal tendinosis, right Yes    Chronic foot pain, right     Metatarsalgia of right foot          Plan:       Agnieszka was seen today for follow-up.    Diagnoses and all orders for this visit:    Peroneal tendinosis, right    Chronic foot pain, right    Metatarsalgia of right foot      I counseled the patient on her conditions, their implications and medical management.    Discussed x-ray and MRI results with patient.  Imaging negative for fracutre, positive for edema in peroneal tendons.    Reviewed MRI noting peroneal tendinosis, no fracture or dislocation noted.    Clinically patient has lateral column overload secondary to only partially compensated FF varus right foot.     Patient may gradually transition from orthopedic boot back to tennis  shoe as discussed.    Return to clinic as needed.

## 2020-03-03 DIAGNOSIS — Z12.31 ENCOUNTER FOR SCREENING MAMMOGRAM FOR BREAST CANCER: Primary | ICD-10-CM

## 2020-03-11 ENCOUNTER — HOSPITAL ENCOUNTER (OUTPATIENT)
Dept: RADIOLOGY | Facility: HOSPITAL | Age: 75
Discharge: HOME | End: 2020-03-11
Attending: FAMILY MEDICINE
Payer: MEDICARE

## 2020-03-11 DIAGNOSIS — Z12.31 ENCOUNTER FOR SCREENING MAMMOGRAM FOR BREAST CANCER: ICD-10-CM

## 2020-03-11 PROCEDURE — 77063 BREAST TOMOSYNTHESIS BI: CPT | Mod: 26,,, | Performed by: RADIOLOGY

## 2020-03-11 PROCEDURE — 77067 SCR MAMMO BI INCL CAD: CPT | Mod: 26,,, | Performed by: RADIOLOGY

## 2020-03-11 PROCEDURE — 77067 MAMMO DIGITAL SCREENING BILAT WITH TOMOSYNTHESIS_CAD: ICD-10-PCS | Mod: 26,,, | Performed by: RADIOLOGY

## 2020-03-11 PROCEDURE — 77063 MAMMO DIGITAL SCREENING BILAT WITH TOMOSYNTHESIS_CAD: ICD-10-PCS | Mod: 26,,, | Performed by: RADIOLOGY

## 2020-03-11 PROCEDURE — 77067 SCR MAMMO BI INCL CAD: CPT | Mod: TC

## 2020-07-13 PROBLEM — E78.00 HIGH CHOLESTEROL: Status: ACTIVE | Noted: 2017-03-21

## 2020-07-13 PROBLEM — J31.0 CHRONIC RHINITIS: Status: ACTIVE | Noted: 2017-09-22

## 2020-07-13 PROBLEM — R92.8 OTHER ABNORMAL AND INCONCLUSIVE FINDINGS ON DIAGNOSTIC IMAGING OF BREAST: Status: ACTIVE | Noted: 2020-07-13

## 2020-07-31 ENCOUNTER — HOSPITAL ENCOUNTER (OUTPATIENT)
Dept: RADIOLOGY | Facility: HOSPITAL | Age: 75
Discharge: HOME | End: 2020-07-31
Attending: FAMILY MEDICINE
Payer: MEDICARE

## 2020-07-31 DIAGNOSIS — R10.11 RIGHT UPPER QUADRANT ABDOMINAL PAIN: ICD-10-CM

## 2020-07-31 PROCEDURE — 76700 US ABDOMEN COMPLETE: ICD-10-PCS | Mod: 26,,, | Performed by: RADIOLOGY

## 2020-07-31 PROCEDURE — 76700 US EXAM ABDOM COMPLETE: CPT | Mod: TC

## 2020-07-31 PROCEDURE — 76700 US EXAM ABDOM COMPLETE: CPT | Mod: 26,,, | Performed by: RADIOLOGY

## 2020-09-09 ENCOUNTER — OFFICE VISIT (OUTPATIENT)
Dept: GASTROENTEROLOGY | Facility: CLINIC | Age: 75
End: 2020-09-09
Payer: MEDICARE

## 2020-09-09 VITALS — BODY MASS INDEX: 26.62 KG/M2 | WEIGHT: 140.88 LBS

## 2020-09-09 DIAGNOSIS — K83.8 DILATED BILE DUCT: ICD-10-CM

## 2020-09-09 DIAGNOSIS — R93.5 ABNORMAL ULTRASOUND OF ABDOMEN: Primary | ICD-10-CM

## 2020-09-09 DIAGNOSIS — Z01.812 PRE-PROCEDURE LAB EXAM: ICD-10-CM

## 2020-09-09 DIAGNOSIS — R79.89 ELEVATED LFTS: ICD-10-CM

## 2020-09-09 PROCEDURE — 1159F PR MEDICATION LIST DOCUMENTED IN MEDICAL RECORD: ICD-10-PCS | Mod: S$GLB,,, | Performed by: INTERNAL MEDICINE

## 2020-09-09 PROCEDURE — 99999 PR PBB SHADOW E&M-EST. PATIENT-LVL III: CPT | Mod: PBBFAC,,, | Performed by: INTERNAL MEDICINE

## 2020-09-09 PROCEDURE — 1101F PR PT FALLS ASSESS DOC 0-1 FALLS W/OUT INJ PAST YR: ICD-10-PCS | Mod: CPTII,S$GLB,, | Performed by: INTERNAL MEDICINE

## 2020-09-09 PROCEDURE — 1101F PT FALLS ASSESS-DOCD LE1/YR: CPT | Mod: CPTII,S$GLB,, | Performed by: INTERNAL MEDICINE

## 2020-09-09 PROCEDURE — 99204 PR OFFICE/OUTPT VISIT, NEW, LEVL IV, 45-59 MIN: ICD-10-PCS | Mod: S$GLB,,, | Performed by: INTERNAL MEDICINE

## 2020-09-09 PROCEDURE — 99999 PR PBB SHADOW E&M-EST. PATIENT-LVL III: ICD-10-PCS | Mod: PBBFAC,,, | Performed by: INTERNAL MEDICINE

## 2020-09-09 PROCEDURE — 1159F MED LIST DOCD IN RCRD: CPT | Mod: S$GLB,,, | Performed by: INTERNAL MEDICINE

## 2020-09-09 PROCEDURE — 99204 OFFICE O/P NEW MOD 45 MIN: CPT | Mod: S$GLB,,, | Performed by: INTERNAL MEDICINE

## 2020-09-09 NOTE — PROGRESS NOTES
GASTROENTEROLOGY CLINIC NOTE    Chief Complaint: The primary encounter diagnosis was Abnormal ultrasound of abdomen. Diagnoses of Elevated LFTs, Dilated bile duct, and Pre-procedure lab exam were also pertinent to this visit.  Referring provider/PCP: Pj Mills MD    HPI:  Agnieszka Trujillo is a 75 y.o. female who is a new patient to me with a PMH that is significant for HTN and thyroid disease.  She is here today to establish care for epigastric and RUQ abdominal pain.  This is an ongoing problem for over a year and has worsened over the last 8 months. She first sought care of her PCP two months ago with c/o epigastric pain and LUQ pain. CBC, CMP, Amylase, and Lipase were done and protonix was started. She followed up with her PCP and then endorsed a c/o RUQ abdominal pain. Ultrasound was then ordered which showed a 7mm gallstone and dilated common bile duct (see report below). She reports the epigastric and RUQ pain occurs twice a week and pain is intensified with eating.  Reports early satiety and feels as though food is sitting at the bottom of her esophagus.  She also states at times she has to force herself to regurgitate following a meal to ease the full feeling and pain.  Denies any changes in bowel habits. Denies hematemesis, melena, or hematochezia.        Prior Endoscopy: [facility] 35-40 years ago; diagnosed with stomach ulcer  Prior Colonoscopy: 2019 FIT test negative per patient; unsure of last colonoscopy   Family h/o Colon Cancer: No  Abdominal Surgeries: No    GI ROS:  Reflux: No  Dysphagia: occasional with chips or crackers  Constipation: No  Diarrhea: No  Rectal bleeding/Melena/hematemesis: No  NSAIDs: No  Anticoagulation or Antiplatelet: ASA 81 mg daily    Review of Systems   Constitutional: Negative for weight loss.   HENT: Negative for sore throat.    Eyes: Negative for blurred vision.   Respiratory: Negative for cough.    Cardiovascular: Negative for chest pain.   Gastrointestinal:  Positive for abdominal pain (Epigastric and RUQ) and vomiting. Negative for blood in stool, constipation, diarrhea, heartburn, melena and nausea.   Genitourinary: Negative for dysuria.   Musculoskeletal: Negative for myalgias.   Skin: Negative for rash.   Neurological: Negative for headaches.   Endo/Heme/Allergies: Negative for environmental allergies.   Psychiatric/Behavioral: Negative for suicidal ideas. The patient is not nervous/anxious.        Past Medical History: has a past medical history of Hypertension and Thyroid disease.    Past Surgical History: has a past surgical history that includes Breast biopsy (Right); Hysterectomy; Oophorectomy; Colonoscopy; and Upper gastrointestinal endoscopy.    Family History:family history includes Diabetes in her maternal grandmother.    Allergies: Review of patient's allergies indicates:  No Known Allergies    Social History: reports that she has never smoked. She does not have any smokeless tobacco history on file. She reports current alcohol use. She reports that she does not use drugs.    Home medications:   Current Outpatient Medications on File Prior to Visit   Medication Sig Dispense Refill    aspirin 81 MG Chew Take 81 mg by mouth once daily.      citalopram (CELEXA) 20 MG tablet Take 1 tablet by mouth once daily.      cyanocobalamin (VITAMIN B-12) 500 MCG tablet Take 500 mcg by mouth once daily.      levothyroxine (SYNTHROID) 100 MCG tablet Take 1 tablet by mouth once daily.      losartan (COZAAR) 100 MG tablet Take 1 tablet by mouth once daily.      pantoprazole (PROTONIX) 40 MG tablet Take 1 tablet (40 mg total) by mouth once daily. 90 tablet 3    simvastatin (ZOCOR) 40 MG tablet Take 1 tablet by mouth once daily.      trazodone (DESYREL) 100 MG tablet Take 1 tablet by mouth once daily.      vitamin D 1000 units Tab Take 185 mg by mouth once daily.      vitamin E 400 UNIT capsule Take 400 Units by mouth once daily.      fluticasone propionate  (FLONASE) 50 mcg/actuation nasal spray 2 sprays by Nasal route once daily.       No current facility-administered medications on file prior to visit.        Vital signs:  Wt 63.9 kg (140 lb 14 oz)   LMP  (LMP Unknown)   BMI 26.62 kg/m²     Physical Exam  Vitals signs reviewed.   Constitutional:       General: She is not in acute distress.     Appearance: Normal appearance. She is not ill-appearing.   HENT:      Head: Normocephalic.   Cardiovascular:      Rate and Rhythm: Normal rate and regular rhythm.      Heart sounds: Normal heart sounds. No murmur.   Pulmonary:      Effort: Pulmonary effort is normal. No respiratory distress.      Breath sounds: Normal breath sounds.   Chest:      Chest wall: No tenderness.   Abdominal:      General: Bowel sounds are normal. There is no distension.      Palpations: Abdomen is soft.      Tenderness: There is abdominal tenderness in the right upper quadrant and epigastric area. There is no rebound. Negative signs include Mclean's sign.      Hernia: No hernia is present.   Skin:     General: Skin is warm.   Neurological:      Mental Status: She is alert and oriented to person, place, and time.   Psychiatric:         Mood and Affect: Mood normal.         Behavior: Behavior normal.         Routine labs:  Lab Results   Component Value Date    WBC 5.0 07/13/2020    HGB 12.0 07/13/2020    HCT 36.7 07/13/2020    MCV 89.1 07/13/2020     07/13/2020     No results found for: INR  No results found for: IRON, FERRITIN, TIBC, FESATURATED  Lab Results   Component Value Date     07/13/2020    K 4.4 07/13/2020     07/13/2020    CO2 23 07/13/2020    BUN 14 07/13/2020    CREATININE 0.93 07/13/2020     Lab Results   Component Value Date    ALBUMIN 3.9 07/13/2020    ALT 37 (H) 07/13/2020    AST 27 07/13/2020    ALKPHOS 373 (H) 07/13/2020    BILITOT 1.0 07/13/2020     No results found for: GLUCOSE  Lab Results   Component Value Date    LIPASE 33 07/13/2020     Lab Results    Component Value Date    AMYLASE 55 07/13/2020       Imaging:  Abdominal Ultrasound 7/31/2020  FINDINGS:  Pancreas: The visualized portions of pancreas appear normal.     Aorta: No aneurysm.     Liver: 13.2 cm, normal in size. Homogeneous parenchymal echotexture. No focal lesions.     Gallbladder: There is a 7 mm stone.  The gallbladder is distended.  There is no gallbladder wall thickening or hyperemia.  There is no pericholecystic fluid.  Negative sonographic Mclean's sign.     Biliary system: 11 mm common bile duct (dilated).  There is intrahepatic biliary dilation.     Inferior vena cava: Normal in appearance.     Right kidney: 10.1 cm. No hydronephrosis.  Anechoic simple cyst at the superior pole measuring 1.5 x 1.5 x 1.6 cm.     Left kidney: 10.2 cm. No hydronephrosis.     Spleen: 11.7 x 4.7 cm.  Normal in size with homogeneous echotexture.     Miscellaneous: No ascites.     Impression:     1. Intra and extrahepatic biliary dilation as above. Further evaluation with MRCP or ERCP should be considered if clinically indicated.  2. Cholelithiasis without evidence of acute cholecystitis.      I have reviewed prior labs, imaging, and notes.      Assessment:  1. Abnormal ultrasound of abdomen    2. Elevated LFTs    3. Dilated bile duct    4. Pre-procedure lab exam        Plan:  Orders Placed This Encounter    COVID-19 Routine Screening    Case request GI: EGD (ESOPHAGOGASTRODUODENOSCOPY), ULTRASOUND, UPPER GI TRACT, ENDOSCOPIC, ERCP (ENDOSCOPIC RETROGRADE CHOLANGIOPANCREATOGRAPHY)   1. EGD   2. EUS with ERCP if indicated      Follow Up: After Procedure        EMY Mayorga,FNP-BC  [facility]

## 2020-09-09 NOTE — PATIENT INSTRUCTIONS
COVID SCREENIN2020 10:50AM  [address] [facility]      Your ERCP/ EGD/ UEUS is scheduled for 2020 with Bang at [facility], [address] [place] [address].  Check in at the admit desk on the first floor of the hospital.  Please contact the office at [phone] two days before procedure date to reschedule if you needed.    To ensure that your test is accurate and complete you must follow these instructions.  If you have any questions, please call our office at [phone].    Clear Liquids after 7:00 pm on the evening before your procedure.  Brush your teeth with small amount of water is allowed.     Follow a CLEAR LIQUID DIET.  This means no solid food after 7pm.   A clear liquid diet includes the following:  o Water, Coffee or decaffeinated coffee (no milk or cream)  o Tea, Herbal tea  o Carbonated beverages (soft drinks), regular and sugar free  o Gelatin  o Apple Juice, white grape juice, white cranberry juice  o Gatorade, Power Aid, Crystal Light, Fazal Aid (Yellow, Green, Clear)  o Lemonade and Limeade  o Bouillon, clear consomme'  o Snowball, popsicles  (Yellow, Green, Clear)    DO NOT DRINK ANY LIQUIDS CONTAINING RED,ORANGE,PURPLE,OR BLUE DYE  DO NOT DRINK ANY LIQUIDS NOT SPECIFICALLY LISTED  DO NOT DRINK ALCOHOL    You may take medication for blood pressure, seizure or heart with a small amount of water at 600 am the morning of the procedure.  Take none of your other medications until after the procedure is complete.    If you are on blood thinners THAT YOU HAVE BEEN INSTRUCTED TO HOLD BY YOUR DOCTOR FOR THIS PROCEDURE, then do NOT take this the morning of your procedure.  Do NOT stop these medications on your own, they must be approved to be held by your doctor.   Your procedure can NOT be done if you are on these medications.  Examples of blood thinners include: Coumadin, Aggrenox, Plavix, Pradaxa, Reapro, Pletal, Xarelto, Ticagrelor, Brilinta, Eliquis, and  high dose aspirin (325 mg).  You do not have to stop baby aspirin 81 mg.      The hospital will call two days before the procedure with an arrival time.    YOU MUST HAVE SOMEONE WITH YOU TO DRIVE YOU HOME SINCE YOU WILL RECEIVE IV SEDATION    Thanks for choosing [facility]

## 2020-09-20 ENCOUNTER — CLINICAL SUPPORT (OUTPATIENT)
Dept: URGENT CARE | Facility: CLINIC | Age: 75
End: 2020-09-20
Payer: MEDICARE

## 2020-09-20 VITALS — TEMPERATURE: 98 F

## 2020-09-20 DIAGNOSIS — Z01.812 PRE-PROCEDURE LAB EXAM: ICD-10-CM

## 2020-09-20 LAB — SARS-COV-2 RNA RESP QL NAA+PROBE: NOT DETECTED

## 2020-09-20 PROCEDURE — U0003 INFECTIOUS AGENT DETECTION BY NUCLEIC ACID (DNA OR RNA); SEVERE ACUTE RESPIRATORY SYNDROME CORONAVIRUS 2 (SARS-COV-2) (CORONAVIRUS DISEASE [COVID-19]), AMPLIFIED PROBE TECHNIQUE, MAKING USE OF HIGH THROUGHPUT TECHNOLOGIES AS DESCRIBED BY CMS-2020-01-R: HCPCS

## 2020-09-21 ENCOUNTER — TELEPHONE (OUTPATIENT)
Dept: ENDOSCOPY | Facility: HOSPITAL | Age: 75
End: 2020-09-21

## 2020-09-21 NOTE — TELEPHONE ENCOUNTER
Spoke with patient about arrival time @ 0700.   Covid test = negative.    NPO status reviewed: Patient may eat until 7:00pm.  After 7pm, pt may have CLEAR liquids ONLY until completely NPO at Midnight.    Medications: Do not take Insulin or oral diabetic medications the day of the procedure.    Take as prescribed: heart, seizure and blood pressure medication in the morning with a sip of water (less than an ounce).  Take any breathing medications and bring inhalers to hospital with you.     Leave all valuables and jewelry at home. Wear comfortable clothes to procedure to change into hospital gown.   You cannot drive for 24 hours after your procedure because you will receive sedation for your procedure to make you comfortable.    A ride must be provided at discharge.

## 2020-09-21 NOTE — TELEPHONE ENCOUNTER
Left message instructing patient to call dept @ [phone] between 8am-4pm.    Arrival time to be given @ 0700

## 2020-09-22 ENCOUNTER — ANESTHESIA EVENT (OUTPATIENT)
Dept: ENDOSCOPY | Facility: HOSPITAL | Age: 75
End: 2020-09-22
Payer: MEDICARE

## 2020-09-23 ENCOUNTER — TELEPHONE (OUTPATIENT)
Dept: ENDOSCOPY | Facility: HOSPITAL | Age: 75
End: 2020-09-23

## 2020-09-23 ENCOUNTER — ANESTHESIA (OUTPATIENT)
Dept: ENDOSCOPY | Facility: HOSPITAL | Age: 75
End: 2020-09-23
Payer: MEDICARE

## 2020-09-23 ENCOUNTER — HOSPITAL ENCOUNTER (OUTPATIENT)
Facility: HOSPITAL | Age: 75
Discharge: HOME | End: 2020-09-23
Attending: INTERNAL MEDICINE | Admitting: INTERNAL MEDICINE
Payer: MEDICARE

## 2020-09-23 VITALS
BODY MASS INDEX: 26.43 KG/M2 | DIASTOLIC BLOOD PRESSURE: 62 MMHG | HEIGHT: 61 IN | TEMPERATURE: 98 F | HEART RATE: 54 BPM | RESPIRATION RATE: 18 BRPM | SYSTOLIC BLOOD PRESSURE: 119 MMHG | WEIGHT: 140 LBS | OXYGEN SATURATION: 98 %

## 2020-09-23 DIAGNOSIS — K83.8 DILATED CBD, ACQUIRED: ICD-10-CM

## 2020-09-23 DIAGNOSIS — K80.50 COMMON BILE DUCT STONE: Primary | ICD-10-CM

## 2020-09-23 PROCEDURE — 43259 EGD US EXAM DUODENUM/JEJUNUM: CPT | Performed by: INTERNAL MEDICINE

## 2020-09-23 PROCEDURE — 37000008 HC ANESTHESIA 1ST 15 MINUTES: Performed by: INTERNAL MEDICINE

## 2020-09-23 PROCEDURE — 43262 ENDO CHOLANGIOPANCREATOGRAPH: CPT | Performed by: INTERNAL MEDICINE

## 2020-09-23 PROCEDURE — 27201674 HC SPHINCTERTOME: Performed by: INTERNAL MEDICINE

## 2020-09-23 PROCEDURE — C1769 GUIDE WIRE: HCPCS | Performed by: INTERNAL MEDICINE

## 2020-09-23 PROCEDURE — 43262 ENDO CHOLANGIOPANCREATOGRAPH: CPT | Mod: ,,, | Performed by: INTERNAL MEDICINE

## 2020-09-23 PROCEDURE — 43259 EGD US EXAM DUODENUM/JEJUNUM: CPT | Mod: 51,,, | Performed by: INTERNAL MEDICINE

## 2020-09-23 PROCEDURE — 25500020 PHARM REV CODE 255: Performed by: INTERNAL MEDICINE

## 2020-09-23 PROCEDURE — 74328 X-RAY BILE DUCT ENDOSCOPY: CPT | Mod: 26,,, | Performed by: INTERNAL MEDICINE

## 2020-09-23 PROCEDURE — 25000003 PHARM REV CODE 250: Performed by: INTERNAL MEDICINE

## 2020-09-23 PROCEDURE — 27202125 HC BALLOON, EXTRACTION (ANY): Performed by: INTERNAL MEDICINE

## 2020-09-23 PROCEDURE — 63600175 PHARM REV CODE 636 W HCPCS: Performed by: NURSE ANESTHETIST, CERTIFIED REGISTERED

## 2020-09-23 PROCEDURE — 74328 PR  X-RAY FOR BILE DUCT ENDOSCOPY: ICD-10-PCS | Mod: 26,,, | Performed by: INTERNAL MEDICINE

## 2020-09-23 PROCEDURE — 37000009 HC ANESTHESIA EA ADD 15 MINS: Performed by: INTERNAL MEDICINE

## 2020-09-23 PROCEDURE — 43262 PR ERCP,SPHINCTEROTOMY: ICD-10-PCS | Mod: ,,, | Performed by: INTERNAL MEDICINE

## 2020-09-23 PROCEDURE — 43259 PR ENDOSCOPIC ULTRASOUND EXAM: ICD-10-PCS | Mod: 51,,, | Performed by: INTERNAL MEDICINE

## 2020-09-23 RX ORDER — SODIUM CHLORIDE 9 MG/ML
INJECTION, SOLUTION INTRAVENOUS CONTINUOUS
Status: DISCONTINUED | OUTPATIENT
Start: 2020-09-23 | End: 2020-09-23 | Stop reason: HOSPADM

## 2020-09-23 RX ORDER — PROPOFOL 10 MG/ML
VIAL (ML) INTRAVENOUS
Status: DISCONTINUED | OUTPATIENT
Start: 2020-09-23 | End: 2020-09-23

## 2020-09-23 RX ORDER — INDOMETHACIN 50 MG/1
SUPPOSITORY RECTAL
Status: COMPLETED | OUTPATIENT
Start: 2020-09-23 | End: 2020-09-23

## 2020-09-23 RX ORDER — LIDOCAINE HYDROCHLORIDE 20 MG/ML
INJECTION INTRAVENOUS
Status: DISCONTINUED | OUTPATIENT
Start: 2020-09-23 | End: 2020-09-23

## 2020-09-23 RX ORDER — SODIUM CHLORIDE 0.9 % (FLUSH) 0.9 %
10 SYRINGE (ML) INJECTION
Status: DISCONTINUED | OUTPATIENT
Start: 2020-09-23 | End: 2020-09-23 | Stop reason: HOSPADM

## 2020-09-23 RX ORDER — PROPOFOL 10 MG/ML
VIAL (ML) INTRAVENOUS CONTINUOUS PRN
Status: DISCONTINUED | OUTPATIENT
Start: 2020-09-23 | End: 2020-09-23

## 2020-09-23 RX ORDER — PHENYLEPHRINE HYDROCHLORIDE 10 MG/ML
INJECTION INTRAVENOUS
Status: DISCONTINUED | OUTPATIENT
Start: 2020-09-23 | End: 2020-09-23

## 2020-09-23 RX ADMIN — PROPOFOL 60 MG: 10 INJECTION, EMULSION INTRAVENOUS at 08:09

## 2020-09-23 RX ADMIN — PROPOFOL 150 MCG/KG/MIN: 10 INJECTION, EMULSION INTRAVENOUS at 08:09

## 2020-09-23 RX ADMIN — LIDOCAINE HYDROCHLORIDE 60 MG: 20 INJECTION, SOLUTION INTRAVENOUS at 08:09

## 2020-09-23 RX ADMIN — IOTHALAMATE MEGLUMINE 20 ML: 430 INJECTION INTRAVASCULAR at 09:09

## 2020-09-23 RX ADMIN — PHENYLEPHRINE HYDROCHLORIDE 100 MCG: 10 INJECTION INTRAVENOUS at 08:09

## 2020-09-23 RX ADMIN — INDOMETHACIN 100 MG: 50 SUPPOSITORY RECTAL at 09:09

## 2020-09-23 RX ADMIN — SODIUM CHLORIDE: 0.9 INJECTION, SOLUTION INTRAVENOUS at 08:09

## 2020-09-23 NOTE — [employer]ATION PATIENT INSTRUCTIONS
Discharge Summary/Instructions after an Endoscopic Procedure  Patient Name: Agnieszka Trujillo  Patient MRN: [id number]  Patient YOB: 1945 Wednesday, September 23, 2020  Renaldo Cuenca MD  Your health is very important to us during the Covid Crisis. Following your   procedure today, you will receive a daily text for 2 weeks asking about   signs or symptoms of Covid 19.  Please respond to this text when you   receive it so we can follow up and keep you as safe as possible.   RESTRICTIONS:  During your procedure today, you received medications for sedation.  These   medications may affect your judgment, balance and coordination.  Therefore,   for 24 hours, you have the following restrictions:   - DO NOT drive a car, operate machinery, make legal/financial decisions,   sign important papers or drink alcohol.    ACTIVITY:  Today: no heavy lifting, straining or running due to procedural   sedation/anesthesia.  The following day: return to full activity including work.  DIET:  Eat and drink normally unless instructed otherwise.     TREATMENT FOR COMMON SIDE EFFECTS:  - Mild abdominal pain, nausea, belching, bloating or excessive gas:  rest,   eat lightly and use a heating pad.  - Sore Throat: treat with throat lozenges and/or gargle with warm salt   water.  - Because air was used during the procedure, expelling large amounts of air   from your rectum or belching is normal.  - If a bowel prep was taken, you may not have a bowel movement for 1-3 days.    This is normal.  SYMPTOMS TO WATCH FOR AND REPORT TO YOUR PHYSICIAN:  1. Abdominal pain or bloating, other than gas cramps.  2. Chest pain.  3. Back pain.  4. Signs of infection such as: chills or fever occurring within 24 hours   after the procedure.  5. Rectal bleeding, which would show as bright red, maroon, or black stools.   (A tablespoon of blood from the rectum is not serious, especially if   hemorrhoids are present.)  6. Vomiting.  7. Weakness or  dizziness.  GO DIRECTLY TO THE NEAREST EMERGENCY ROOM IF YOU HAVE ANY OF THE FOLLOWING:      Difficulty breathing              Chills and/or fever over 101 F   Persistent vomiting and/or vomiting blood   Severe abdominal pain   Severe chest pain   Black, tarry stools   Bleeding- more than one tablespoon   Any other symptom or condition that you feel may need urgent attention  Your doctor recommends these additional instructions:  If any biopsies were taken, your doctors clinic will contact you in 1 to 2   weeks with any results.  - Discharge patient to home.   - Resume previous diet.   - Continue present medications.   - Repeat ERCP PRN for retreatment. May need choledochoscopy  For questions, problems or results please call your physician - Renaldo Cuenca MD.  EMERGENCY PHONE NUMBER: [phone],  LAB RESULTS: [phone]  IF A COMPLICATION OR EMERGENCY SITUATION ARISES AND YOU ARE UNABLE TO REACH   YOUR PHYSICIAN - GO DIRECTLY TO THE EMERGENCY ROOM.  Renaldo Cuenca MD  9/23/2020 9:22:14 AM  This report has been verified and signed electronically.  [employer]ATION

## 2020-09-23 NOTE — H&P
Short Stay Endoscopy History and Physical    PCP - Pj Mills MD  Referring Physician - Arnoldo Coleman MD  [address] [place] [facility] [place]  [address]  [place] [address]    Procedure - eus/ercp  ASA - per anesthesia  Mallampati - per anesthesia  History of Anesthesia problems - no  Family history Anesthesia problems -  no   Plan of anesthesia - General    HPI:  This is a 75 y.o. female here for evaluation of: dilated cbd    Reflux - no  Dysphagia - no  Abdominal pain - no  Diarrhea - no    ROS:  Constitutional: No fevers, chills, No weight loss  CV: No chest pain  Pulm: No cough, No shortness of breath  Ophtho: No vision changes  GI: see HPI  Derm: No rash    Medical History:  has a past medical history of Hypertension and Thyroid disease.    Surgical History:  has a past surgical history that includes Breast biopsy (Right); Hysterectomy; Oophorectomy; Colonoscopy; and Upper gastrointestinal endoscopy.    Family History: family history includes Diabetes in her maternal grandmother..    Social History:  reports that she has never smoked. She has never used smokeless tobacco. She reports previous alcohol use. She reports that she does not use drugs.    Review of patient's allergies indicates:  No Known Allergies    Medications:   Medications Prior to Admission   Medication Sig Dispense Refill Last Dose    pantoprazole (PROTONIX) 40 MG tablet Take 1 tablet (40 mg total) by mouth once daily. 90 tablet 3 9/22/2020 at Unknown time    aspirin 81 MG Chew Take 81 mg by mouth once daily.   9/21/2020    citalopram (CELEXA) 20 MG tablet Take 1 tablet by mouth once daily.   9/21/2020    cyanocobalamin (VITAMIN B-12) 500 MCG tablet Take 500 mcg by mouth once daily.   9/21/2020    fluticasone propionate (FLONASE) 50 mcg/actuation nasal spray 2 sprays by Nasal route once daily.       levothyroxine (SYNTHROID) 100 MCG tablet Take 1 tablet by mouth once daily.   9/22/2020    losartan (COZAAR) 100 MG tablet Take 1 tablet by  mouth once daily.   9/21/2020    simvastatin (ZOCOR) 40 MG tablet Take 1 tablet by mouth once daily.   9/21/2020    trazodone (DESYREL) 100 MG tablet Take 1 tablet by mouth once daily.   9/21/2020    vitamin D 1000 units Tab Take 185 mg by mouth once daily.   9/21/2020    vitamin E 400 UNIT capsule Take 400 Units by mouth once daily.   9/21/2020       Physical Exam:    Vital Signs:   Vitals:    09/23/20 0756   BP: 115/62   Pulse: 64   Resp: 20   Temp: 98.1 °F (36.7 °C)       General Appearance: Well appearing in no acute distress    Labs:  Lab Results   Component Value Date    WBC 5.0 07/13/2020    HGB 12.0 07/13/2020    HCT 36.7 07/13/2020     07/13/2020    ALT 37 (H) 07/13/2020    AST 27 07/13/2020     07/13/2020    K 4.4 07/13/2020     07/13/2020    CREATININE 0.93 07/13/2020    BUN 14 07/13/2020    CO2 23 07/13/2020    TSH 0.89 07/13/2020       I have explained the risks and benefits of this endoscopic procedure to the patient including but not limited to bleeding, inflammation, infection, perforation, and death.      Renaldo Cuenca MD

## 2020-09-23 NOTE — ANESTHESIA PREPROCEDURE EVALUATION
09/23/2020  Agnieszka Trujillo is a 75 y.o., female with PMH of HTN and abnormal abdominal ultrasound scheduled for EUS/ERCP.     Anesthesia Evaluation    I have reviewed the Patient Summary Reports.    I have reviewed the Nursing Notes.       Review of Systems  Anesthesia Hx:  No problems with previous Anesthesia  History of prior surgery of interest to airway management or planning: Denies Family Hx of Anesthesia complications.   Denies Personal Hx of Anesthesia complications.   Hematology/Oncology:  Hematology Normal   Oncology Normal     EENT/Dental:EENT/Dental Normal   Cardiovascular:   Hypertension    Pulmonary:  Pulmonary Normal    Renal/:  Renal/ Normal     Hepatic/GI:  Hepatic/GI Normal    Musculoskeletal:  Musculoskeletal Normal    Neurological:  Neurology Normal        Physical Exam  General:  Well nourished    Airway/Jaw/Neck:  Airway Findings: Mouth Opening: Normal Tongue: Normal  General Airway Assessment: Adult  Oropharynx Findings: Normal Mallampati: II  Improves to I with phonation.  TM Distance: Normal, at least 6 cm        Eyes/Ears/Nose:  EYES/EARS/NOSE FINDINGS: Normal   Dental:  DENTAL FINDINGS: Normal   Chest/Lungs:  Chest/Lungs Clear    Heart/Vascular:  Heart Findings: Normal Heart murmur: negative       Mental Status:  Mental Status Findings: Normal        Anesthesia Plan  Type of Anesthesia, risks & benefits discussed:  Anesthesia Type:  general  Patient's Preference:   Intra-op Monitoring Plan: standard ASA monitors  Intra-op Monitoring Plan Comments:   Post Op Pain Control Plan:   Post Op Pain Control Plan Comments:   Induction:   IV  Beta Blocker:  Patient is not currently on a Beta-Blocker (No further documentation required).       Informed Consent: Patient understands risks and agrees with Anesthesia plan.  Questions answered. Anesthesia consent signed with patient.  ASA Score:  2     Day of Surgery Review of History & Physical: I have interviewed and examined the patient. I have reviewed the patient's H&P dated:  There are no significant changes.          Ready For Surgery From Anesthesia Perspective.

## 2020-09-23 NOTE — TRANSFER OF CARE
"Anesthesia Transfer of Care Note    Patient: Agnieszka Trujillo    Procedure(s) Performed: Procedure(s) (LRB):  ULTRASOUND, UPPER GI TRACT, ENDOSCOPIC (N/A)  ERCP (ENDOSCOPIC RETROGRADE CHOLANGIOPANCREATOGRAPHY) (N/A)    Patient location: GI    Anesthesia Type: MAC    Transport from OR: Transported from OR on room air with adequate spontaneous ventilation    Post pain: adequate analgesia    Post assessment: no apparent anesthetic complications and tolerated procedure well    Post vital signs: stable    Level of consciousness: awake, alert and oriented    Nausea/Vomiting: no nausea/vomiting    Complications: none    Transfer of care protocol was followed      Last vitals:   Visit Vitals  /62 (BP Location: Left arm, Patient Position: Lying)   Pulse 64   Temp 36.7 °C (98.1 °F) (Temporal)   Resp 20   Ht 5' 1" (1.549 m)   Wt 63.5 kg (140 lb)   LMP  (LMP Unknown)   SpO2 98%   Breastfeeding No   BMI 26.45 kg/m²     "

## 2020-09-23 NOTE — ANESTHESIA POSTPROCEDURE EVALUATION
Anesthesia Post Evaluation    Patient: Agnieszka Trujillo    Procedure(s) Performed: Procedure(s) (LRB):  ULTRASOUND, UPPER GI TRACT, ENDOSCOPIC (N/A)  ERCP (ENDOSCOPIC RETROGRADE CHOLANGIOPANCREATOGRAPHY) (N/A)    Final Anesthesia Type: MAC    Patient location during evaluation: GI PACU  Patient participation: Yes- Able to Participate  Level of consciousness: awake and alert and oriented  Post-procedure vital signs: reviewed and stable  Pain management: adequate  Airway patency: patent    PONV status at discharge: No PONV  Anesthetic complications: no      Cardiovascular status: blood pressure returned to baseline and hemodynamically stable  Respiratory status: unassisted, spontaneous ventilation and room air  Hydration status: euvolemic  Follow-up not needed.          Vitals Value Taken Time   /75 09/23/20 0931   Temp 97.9 09/23/20 0931   Pulse 65 09/23/20 0931   Resp 18 09/23/20 0931   SpO2 100 09/23/20 0931         No case tracking events are documented in the log.      Pain/Libia Score: No data recorded

## 2020-09-23 NOTE — [employer]OVATION PATIENT INSTRUCTIONS
Discharge Summary/Instructions after an Endoscopic Procedure  Patient Name: Agnieszka Trujillo  Patient MRN: [id number]  Patient YOB: 1945 Wednesday, September 23, 2020  Renaldo Cuenca MD  Your health is very important to us during the Covid Crisis. Following your   procedure today, you will receive a daily text for 2 weeks asking about   signs or symptoms of Covid 19.  Please respond to this text when you   receive it so we can follow up and keep you as safe as possible.   RESTRICTIONS:  During your procedure today, you received medications for sedation.  These   medications may affect your judgment, balance and coordination.  Therefore,   for 24 hours, you have the following restrictions:   - DO NOT drive a car, operate machinery, make legal/financial decisions,   sign important papers or drink alcohol.    ACTIVITY:  Today: no heavy lifting, straining or running due to procedural   sedation/anesthesia.  The following day: return to full activity including work.  DIET:  Eat and drink normally unless instructed otherwise.     TREATMENT FOR COMMON SIDE EFFECTS:  - Mild abdominal pain, nausea, belching, bloating or excessive gas:  rest,   eat lightly and use a heating pad.  - Sore Throat: treat with throat lozenges and/or gargle with warm salt   water.  - Because air was used during the procedure, expelling large amounts of air   from your rectum or belching is normal.  - If a bowel prep was taken, you may not have a bowel movement for 1-3 days.    This is normal.  SYMPTOMS TO WATCH FOR AND REPORT TO YOUR PHYSICIAN:  1. Abdominal pain or bloating, other than gas cramps.  2. Chest pain.  3. Back pain.  4. Signs of infection such as: chills or fever occurring within 24 hours   after the procedure.  5. Rectal bleeding, which would show as bright red, maroon, or black stools.   (A tablespoon of blood from the rectum is not serious, especially if   hemorrhoids are present.)  6. Vomiting.  7. Weakness or  dizziness.  GO DIRECTLY TO THE NEAREST EMERGENCY ROOM IF YOU HAVE ANY OF THE FOLLOWING:      Difficulty breathing              Chills and/or fever over 101 F   Persistent vomiting and/or vomiting blood   Severe abdominal pain   Severe chest pain   Black, tarry stools   Bleeding- more than one tablespoon   Any other symptom or condition that you feel may need urgent attention  Your doctor recommends these additional instructions:  If any biopsies were taken, your doctors clinic will contact you in 1 to 2   weeks with any results.  - Discharge patient to home.   - Perform an ERCP today.   - Continue present medications.   - Resume previous diet.   - Return to referring physician PRN.  For questions, problems or results please call your physician - Renaldo Cuenca MD.  EMERGENCY PHONE NUMBER: [phone],  LAB RESULTS: [phone]  IF A COMPLICATION OR EMERGENCY SITUATION ARISES AND YOU ARE UNABLE TO REACH   YOUR PHYSICIAN - GO DIRECTLY TO THE EMERGENCY ROOM.  Renaldo Cuenca MD  9/23/2020 8:40:37 AM  This report has been verified and signed electronically.  [employer]ATION

## 2020-10-02 PROBLEM — I10 ESSENTIAL HYPERTENSION: Chronic | Status: ACTIVE | Noted: 2019-01-18

## 2020-10-02 PROBLEM — E03.9 HYPOTHYROIDISM: Chronic | Status: ACTIVE | Noted: 2019-01-18

## 2020-10-02 PROBLEM — K83.8 DILATED CBD, ACQUIRED: Status: RESOLVED | Noted: 2020-09-23 | Resolved: 2020-10-02

## 2020-10-02 PROBLEM — E78.00 HIGH CHOLESTEROL: Chronic | Status: ACTIVE | Noted: 2017-03-21

## 2020-10-02 PROBLEM — F32.A DEPRESSION: Status: RESOLVED | Noted: 2020-10-02 | Resolved: 2020-10-02

## 2020-10-02 PROBLEM — F32.A DEPRESSION: Status: ACTIVE | Noted: 2020-10-02

## 2021-04-19 ENCOUNTER — HOSPITAL ENCOUNTER (OUTPATIENT)
Dept: RADIOLOGY | Facility: HOSPITAL | Age: 76
Discharge: HOME | End: 2021-04-19
Attending: FAMILY MEDICINE
Payer: MEDICARE

## 2021-04-19 DIAGNOSIS — Z12.31 ENCOUNTER FOR SCREENING MAMMOGRAM FOR BREAST CANCER: ICD-10-CM

## 2021-04-19 PROCEDURE — 77067 SCR MAMMO BI INCL CAD: CPT | Mod: TC

## 2021-04-19 PROCEDURE — 77067 SCR MAMMO BI INCL CAD: CPT | Mod: 26,,, | Performed by: RADIOLOGY

## 2021-04-19 PROCEDURE — 77063 BREAST TOMOSYNTHESIS BI: CPT | Mod: 26,,, | Performed by: RADIOLOGY

## 2021-04-19 PROCEDURE — 77063 MAMMO DIGITAL SCREENING BILAT WITH TOMO: ICD-10-PCS | Mod: 26,,, | Performed by: RADIOLOGY

## 2021-04-19 PROCEDURE — 77067 MAMMO DIGITAL SCREENING BILAT WITH TOMO: ICD-10-PCS | Mod: 26,,, | Performed by: RADIOLOGY

## 2022-04-23 PROBLEM — E66.01 MORBID (SEVERE) OBESITY DUE TO EXCESS CALORIES: Status: ACTIVE | Noted: 2022-04-23

## 2022-05-02 ENCOUNTER — HOSPITAL ENCOUNTER (OUTPATIENT)
Dept: RADIOLOGY | Facility: HOSPITAL | Age: 77
Discharge: HOME | End: 2022-05-02
Attending: FAMILY MEDICINE
Payer: MEDICARE

## 2022-05-02 DIAGNOSIS — Z12.31 BREAST CANCER SCREENING BY MAMMOGRAM: ICD-10-CM

## 2022-05-02 PROCEDURE — 77063 BREAST TOMOSYNTHESIS BI: CPT | Mod: 26,,, | Performed by: RADIOLOGY

## 2022-05-02 PROCEDURE — 77067 SCR MAMMO BI INCL CAD: CPT | Mod: 26,,, | Performed by: RADIOLOGY

## 2022-05-02 PROCEDURE — 77063 BREAST TOMOSYNTHESIS BI: CPT | Mod: TC

## 2022-05-02 PROCEDURE — 77067 MAMMO DIGITAL SCREENING BILAT WITH TOMO: ICD-10-PCS | Mod: 26,,, | Performed by: RADIOLOGY

## 2022-05-02 PROCEDURE — 77063 MAMMO DIGITAL SCREENING BILAT WITH TOMO: ICD-10-PCS | Mod: 26,,, | Performed by: RADIOLOGY

## 2022-05-10 ENCOUNTER — LAB VISIT (OUTPATIENT)
Dept: LAB | Facility: HOSPITAL | Age: 77
End: 2022-05-10
Attending: FAMILY MEDICINE
Payer: MEDICARE

## 2022-05-10 DIAGNOSIS — I10 ESSENTIAL HYPERTENSION: ICD-10-CM

## 2022-05-10 LAB
ANION GAP SERPL CALC-SCNC: 10 MMOL/L (ref 8–16)
BUN SERPL-MCNC: 13 MG/DL (ref 8–23)
CALCIUM SERPL-MCNC: 9.3 MG/DL (ref 8.7–10.5)
CHLORIDE SERPL-SCNC: 109 MMOL/L (ref 95–110)
CO2 SERPL-SCNC: 24 MMOL/L (ref 23–29)
CREAT SERPL-MCNC: 1.1 MG/DL (ref 0.5–1.4)
EST. GFR  (AFRICAN AMERICAN): 56 ML/MIN/1.73 M^2
EST. GFR  (NON AFRICAN AMERICAN): 49 ML/MIN/1.73 M^2
GLUCOSE SERPL-MCNC: 102 MG/DL (ref 70–110)
POTASSIUM SERPL-SCNC: 4.1 MMOL/L (ref 3.5–5.1)
PTH-INTACT SERPL-MCNC: 77.5 PG/ML (ref 9–77)
SODIUM SERPL-SCNC: 143 MMOL/L (ref 136–145)
T4 FREE SERPL-MCNC: 1.51 NG/DL (ref 0.71–1.51)
TSH SERPL DL<=0.005 MIU/L-ACNC: 0.09 UIU/ML (ref 0.4–4)

## 2022-05-10 PROCEDURE — 80048 BASIC METABOLIC PNL TOTAL CA: CPT | Performed by: FAMILY MEDICINE

## 2022-05-10 PROCEDURE — 84439 ASSAY OF FREE THYROXINE: CPT | Performed by: FAMILY MEDICINE

## 2022-05-10 PROCEDURE — 36415 COLL VENOUS BLD VENIPUNCTURE: CPT | Performed by: FAMILY MEDICINE

## 2022-05-10 PROCEDURE — 84443 ASSAY THYROID STIM HORMONE: CPT | Performed by: FAMILY MEDICINE

## 2022-05-10 PROCEDURE — 83970 ASSAY OF PARATHORMONE: CPT | Performed by: FAMILY MEDICINE

## 2022-06-30 ENCOUNTER — LAB VISIT (OUTPATIENT)
Dept: LAB | Facility: HOSPITAL | Age: 77
End: 2022-06-30
Attending: FAMILY MEDICINE
Payer: MEDICARE

## 2022-06-30 DIAGNOSIS — E03.9 HYPOTHYROIDISM, UNSPECIFIED TYPE: ICD-10-CM

## 2022-06-30 LAB — TSH SERPL DL<=0.005 MIU/L-ACNC: 1.43 UIU/ML (ref 0.4–4)

## 2022-06-30 PROCEDURE — 84443 ASSAY THYROID STIM HORMONE: CPT | Performed by: FAMILY MEDICINE

## 2022-06-30 PROCEDURE — 36415 COLL VENOUS BLD VENIPUNCTURE: CPT | Performed by: FAMILY MEDICINE

## 2022-10-20 ENCOUNTER — LAB VISIT (OUTPATIENT)
Dept: LAB | Facility: HOSPITAL | Age: 77
End: 2022-10-20
Attending: FAMILY MEDICINE
Payer: MEDICARE

## 2022-10-20 DIAGNOSIS — Z79.899 OTHER LONG TERM (CURRENT) DRUG THERAPY: ICD-10-CM

## 2022-10-20 DIAGNOSIS — E78.00 HIGH CHOLESTEROL: Chronic | ICD-10-CM

## 2022-10-20 DIAGNOSIS — I10 ESSENTIAL HYPERTENSION: Chronic | ICD-10-CM

## 2022-10-20 LAB
ALBUMIN SERPL BCP-MCNC: 3.7 G/DL (ref 3.5–5.2)
ALP SERPL-CCNC: 104 U/L (ref 55–135)
ALT SERPL W/O P-5'-P-CCNC: 7 U/L (ref 10–44)
ANION GAP SERPL CALC-SCNC: 8 MMOL/L (ref 8–16)
AST SERPL-CCNC: 17 U/L (ref 10–40)
BASOPHILS # BLD AUTO: 0.08 K/UL (ref 0–0.2)
BASOPHILS NFR BLD: 1.2 % (ref 0–1.9)
BILIRUB SERPL-MCNC: 0.6 MG/DL (ref 0.1–1)
BUN SERPL-MCNC: 14 MG/DL (ref 8–23)
CALCIUM SERPL-MCNC: 9.4 MG/DL (ref 8.7–10.5)
CHLORIDE SERPL-SCNC: 108 MMOL/L (ref 95–110)
CHOLEST SERPL-MCNC: 160 MG/DL (ref 120–199)
CHOLEST/HDLC SERPL: 3.6 {RATIO} (ref 2–5)
CO2 SERPL-SCNC: 26 MMOL/L (ref 23–29)
CREAT SERPL-MCNC: 1.1 MG/DL (ref 0.5–1.4)
DIFFERENTIAL METHOD: NORMAL
EOSINOPHIL # BLD AUTO: 0.3 K/UL (ref 0–0.5)
EOSINOPHIL NFR BLD: 3.9 % (ref 0–8)
ERYTHROCYTE [DISTWIDTH] IN BLOOD BY AUTOMATED COUNT: 13.2 % (ref 11.5–14.5)
EST. GFR  (NO RACE VARIABLE): 51 ML/MIN/1.73 M^2
ESTIMATED AVG GLUCOSE: 97 MG/DL (ref 68–131)
GLUCOSE SERPL-MCNC: 89 MG/DL (ref 70–110)
HBA1C MFR BLD: 5 % (ref 4–5.6)
HCT VFR BLD AUTO: 39.6 % (ref 37–48.5)
HDLC SERPL-MCNC: 45 MG/DL (ref 40–75)
HDLC SERPL: 28.1 % (ref 20–50)
HGB BLD-MCNC: 12.9 G/DL (ref 12–16)
IMM GRANULOCYTES # BLD AUTO: 0.02 K/UL (ref 0–0.04)
IMM GRANULOCYTES NFR BLD AUTO: 0.3 % (ref 0–0.5)
LDLC SERPL CALC-MCNC: 91.6 MG/DL (ref 63–159)
LYMPHOCYTES # BLD AUTO: 1.6 K/UL (ref 1–4.8)
LYMPHOCYTES NFR BLD: 23.4 % (ref 18–48)
MCH RBC QN AUTO: 28.7 PG (ref 27–31)
MCHC RBC AUTO-ENTMCNC: 32.6 G/DL (ref 32–36)
MCV RBC AUTO: 88 FL (ref 82–98)
MONOCYTES # BLD AUTO: 0.7 K/UL (ref 0.3–1)
MONOCYTES NFR BLD: 10.7 % (ref 4–15)
NEUTROPHILS # BLD AUTO: 4.2 K/UL (ref 1.8–7.7)
NEUTROPHILS NFR BLD: 60.5 % (ref 38–73)
NONHDLC SERPL-MCNC: 115 MG/DL
NRBC BLD-RTO: 0 /100 WBC
PLATELET # BLD AUTO: 196 K/UL (ref 150–450)
PMV BLD AUTO: 10 FL (ref 9.2–12.9)
POTASSIUM SERPL-SCNC: 4.4 MMOL/L (ref 3.5–5.1)
PROT SERPL-MCNC: 6.7 G/DL (ref 6–8.4)
RBC # BLD AUTO: 4.5 M/UL (ref 4–5.4)
SODIUM SERPL-SCNC: 142 MMOL/L (ref 136–145)
TRIGL SERPL-MCNC: 117 MG/DL (ref 30–150)
WBC # BLD AUTO: 6.92 K/UL (ref 3.9–12.7)

## 2022-10-20 PROCEDURE — 85025 COMPLETE CBC W/AUTO DIFF WBC: CPT | Performed by: FAMILY MEDICINE

## 2022-10-20 PROCEDURE — 80053 COMPREHEN METABOLIC PANEL: CPT | Performed by: FAMILY MEDICINE

## 2022-10-20 PROCEDURE — 36415 COLL VENOUS BLD VENIPUNCTURE: CPT | Performed by: FAMILY MEDICINE

## 2022-10-20 PROCEDURE — 80061 LIPID PANEL: CPT | Performed by: FAMILY MEDICINE

## 2022-10-20 PROCEDURE — 83036 HEMOGLOBIN GLYCOSYLATED A1C: CPT | Performed by: FAMILY MEDICINE

## 2022-10-22 PROBLEM — N18.31 CHRONIC KIDNEY DISEASE, STAGE 3A: Status: ACTIVE | Noted: 2022-10-22

## 2022-10-22 PROBLEM — E66.01 MORBID (SEVERE) OBESITY DUE TO EXCESS CALORIES: Status: RESOLVED | Noted: 2022-04-23 | Resolved: 2022-10-22

## 2023-04-25 ENCOUNTER — LAB VISIT (OUTPATIENT)
Dept: LAB | Facility: HOSPITAL | Age: 78
End: 2023-04-25
Attending: FAMILY MEDICINE
Payer: MEDICARE

## 2023-04-25 DIAGNOSIS — E03.9 HYPOTHYROIDISM, UNSPECIFIED TYPE: Chronic | ICD-10-CM

## 2023-04-25 DIAGNOSIS — N18.31 CHRONIC KIDNEY DISEASE, STAGE 3A: ICD-10-CM

## 2023-04-25 DIAGNOSIS — Z79.899 OTHER LONG TERM (CURRENT) DRUG THERAPY: ICD-10-CM

## 2023-04-25 DIAGNOSIS — I10 ESSENTIAL HYPERTENSION: Chronic | ICD-10-CM

## 2023-04-25 DIAGNOSIS — E78.00 HIGH CHOLESTEROL: Chronic | ICD-10-CM

## 2023-04-25 DIAGNOSIS — D69.2 SENILE PURPURA: ICD-10-CM

## 2023-04-25 PROBLEM — J31.0 CHRONIC RHINITIS: Chronic | Status: ACTIVE | Noted: 2017-09-22

## 2023-04-25 PROBLEM — F32.A DEPRESSION: Chronic | Status: ACTIVE | Noted: 2023-04-25

## 2023-04-25 PROBLEM — F32.A DEPRESSION: Status: ACTIVE | Noted: 2023-04-25

## 2023-04-25 LAB
ALBUMIN SERPL BCP-MCNC: 3.8 G/DL (ref 3.5–5.2)
ALP SERPL-CCNC: 89 U/L (ref 55–135)
ALT SERPL W/O P-5'-P-CCNC: 6 U/L (ref 10–44)
ANION GAP SERPL CALC-SCNC: 11 MMOL/L (ref 8–16)
AST SERPL-CCNC: 15 U/L (ref 10–40)
BASOPHILS # BLD AUTO: 0.09 K/UL (ref 0–0.2)
BASOPHILS NFR BLD: 1.1 % (ref 0–1.9)
BILIRUB SERPL-MCNC: 0.6 MG/DL (ref 0.1–1)
BUN SERPL-MCNC: 21 MG/DL (ref 8–23)
CALCIUM SERPL-MCNC: 9.5 MG/DL (ref 8.7–10.5)
CHLORIDE SERPL-SCNC: 107 MMOL/L (ref 95–110)
CHOLEST SERPL-MCNC: 174 MG/DL (ref 120–199)
CHOLEST/HDLC SERPL: 4 {RATIO} (ref 2–5)
CO2 SERPL-SCNC: 22 MMOL/L (ref 23–29)
CREAT SERPL-MCNC: 1.1 MG/DL (ref 0.5–1.4)
DIFFERENTIAL METHOD: NORMAL
EOSINOPHIL # BLD AUTO: 0.3 K/UL (ref 0–0.5)
EOSINOPHIL NFR BLD: 3.5 % (ref 0–8)
ERYTHROCYTE [DISTWIDTH] IN BLOOD BY AUTOMATED COUNT: 13.2 % (ref 11.5–14.5)
EST. GFR  (NO RACE VARIABLE): 51 ML/MIN/1.73 M^2
ESTIMATED AVG GLUCOSE: 94 MG/DL (ref 68–131)
GLUCOSE SERPL-MCNC: 81 MG/DL (ref 70–110)
HBA1C MFR BLD: 4.9 % (ref 4–5.6)
HCT VFR BLD AUTO: 38.3 % (ref 37–48.5)
HDLC SERPL-MCNC: 44 MG/DL (ref 40–75)
HDLC SERPL: 25.3 % (ref 20–50)
HGB BLD-MCNC: 13 G/DL (ref 12–16)
IMM GRANULOCYTES # BLD AUTO: 0.02 K/UL (ref 0–0.04)
IMM GRANULOCYTES NFR BLD AUTO: 0.3 % (ref 0–0.5)
LDLC SERPL CALC-MCNC: 92.6 MG/DL (ref 63–159)
LYMPHOCYTES # BLD AUTO: 1.7 K/UL (ref 1–4.8)
LYMPHOCYTES NFR BLD: 21.2 % (ref 18–48)
MCH RBC QN AUTO: 29.5 PG (ref 27–31)
MCHC RBC AUTO-ENTMCNC: 33.9 G/DL (ref 32–36)
MCV RBC AUTO: 87 FL (ref 82–98)
MONOCYTES # BLD AUTO: 0.8 K/UL (ref 0.3–1)
MONOCYTES NFR BLD: 10.1 % (ref 4–15)
NEUTROPHILS # BLD AUTO: 5.1 K/UL (ref 1.8–7.7)
NEUTROPHILS NFR BLD: 63.8 % (ref 38–73)
NONHDLC SERPL-MCNC: 130 MG/DL
NRBC BLD-RTO: 0 /100 WBC
PLATELET # BLD AUTO: 211 K/UL (ref 150–450)
PMV BLD AUTO: 10.5 FL (ref 9.2–12.9)
POTASSIUM SERPL-SCNC: 4 MMOL/L (ref 3.5–5.1)
PROT SERPL-MCNC: 6.7 G/DL (ref 6–8.4)
PTH-INTACT SERPL-MCNC: 51.9 PG/ML (ref 9–77)
RBC # BLD AUTO: 4.41 M/UL (ref 4–5.4)
SODIUM SERPL-SCNC: 140 MMOL/L (ref 136–145)
TRIGL SERPL-MCNC: 187 MG/DL (ref 30–150)
TSH SERPL DL<=0.005 MIU/L-ACNC: 1.14 UIU/ML (ref 0.4–4)
WBC # BLD AUTO: 7.93 K/UL (ref 3.9–12.7)

## 2023-04-25 PROCEDURE — 80061 LIPID PANEL: CPT | Performed by: FAMILY MEDICINE

## 2023-04-25 PROCEDURE — 83036 HEMOGLOBIN GLYCOSYLATED A1C: CPT | Performed by: FAMILY MEDICINE

## 2023-04-25 PROCEDURE — 82172 ASSAY OF APOLIPOPROTEIN: CPT | Performed by: FAMILY MEDICINE

## 2023-04-25 PROCEDURE — 80053 COMPREHEN METABOLIC PANEL: CPT | Performed by: FAMILY MEDICINE

## 2023-04-25 PROCEDURE — 85025 COMPLETE CBC W/AUTO DIFF WBC: CPT | Performed by: FAMILY MEDICINE

## 2023-04-25 PROCEDURE — 83970 ASSAY OF PARATHORMONE: CPT | Performed by: FAMILY MEDICINE

## 2023-04-25 PROCEDURE — 84443 ASSAY THYROID STIM HORMONE: CPT | Performed by: FAMILY MEDICINE

## 2023-04-25 PROCEDURE — 83695 ASSAY OF LIPOPROTEIN(A): CPT | Performed by: FAMILY MEDICINE

## 2023-04-27 LAB — APO B SERPL-MCNC: 78 MG/DL

## 2023-04-28 LAB — LPA SERPL-MCNC: 15 MG/DL (ref 0–30)

## 2023-05-29 ENCOUNTER — HOSPITAL ENCOUNTER (OUTPATIENT)
Dept: RADIOLOGY | Facility: HOSPITAL | Age: 78
Discharge: HOME | End: 2023-05-29
Attending: FAMILY MEDICINE
Payer: MEDICARE

## 2023-05-29 DIAGNOSIS — Z12.31 BREAST CANCER SCREENING BY MAMMOGRAM: ICD-10-CM

## 2023-05-29 PROCEDURE — 77067 SCR MAMMO BI INCL CAD: CPT | Mod: TC

## 2023-05-29 PROCEDURE — 77067 MAMMO DIGITAL SCREENING BILAT WITH TOMO: ICD-10-PCS | Mod: 26,,, | Performed by: RADIOLOGY

## 2023-05-29 PROCEDURE — 77063 BREAST TOMOSYNTHESIS BI: CPT | Mod: 26,,, | Performed by: RADIOLOGY

## 2023-05-29 PROCEDURE — 77063 MAMMO DIGITAL SCREENING BILAT WITH TOMO: ICD-10-PCS | Mod: 26,,, | Performed by: RADIOLOGY

## 2023-05-29 PROCEDURE — 77067 SCR MAMMO BI INCL CAD: CPT | Mod: 26,,, | Performed by: RADIOLOGY

## 2024-04-11 ENCOUNTER — LAB VISIT (OUTPATIENT)
Dept: LAB | Facility: HOSPITAL | Age: 79
End: 2024-04-11

## 2024-04-11 DIAGNOSIS — E03.9 HYPOTHYROIDISM, UNSPECIFIED TYPE: ICD-10-CM

## 2024-04-11 DIAGNOSIS — N18.31 CHRONIC KIDNEY DISEASE, STAGE 3A: Chronic | ICD-10-CM

## 2024-04-11 DIAGNOSIS — E78.1 HIGH TRIGLYCERIDES: ICD-10-CM

## 2024-04-11 DIAGNOSIS — I10 ESSENTIAL HYPERTENSION: ICD-10-CM

## 2024-04-11 DIAGNOSIS — D69.2 SENILE PURPURA: ICD-10-CM

## 2024-04-11 DIAGNOSIS — E78.00 HIGH CHOLESTEROL: ICD-10-CM

## 2024-04-11 LAB
ALBUMIN SERPL BCP-MCNC: 4 G/DL (ref 3.5–5.2)
ALP SERPL-CCNC: 85 U/L (ref 55–135)
ALT SERPL W/O P-5'-P-CCNC: 9 U/L (ref 10–44)
ANION GAP SERPL CALC-SCNC: 12 MMOL/L (ref 8–16)
AST SERPL-CCNC: 16 U/L (ref 10–40)
BASOPHILS # BLD AUTO: 0.06 K/UL (ref 0–0.2)
BASOPHILS NFR BLD: 0.7 % (ref 0–1.9)
BILIRUB SERPL-MCNC: 0.9 MG/DL (ref 0.1–1)
BUN SERPL-MCNC: 14 MG/DL (ref 8–23)
CALCIUM SERPL-MCNC: 9.5 MG/DL (ref 8.7–10.5)
CHLORIDE SERPL-SCNC: 108 MMOL/L (ref 95–110)
CHOLEST SERPL-MCNC: 190 MG/DL (ref 120–199)
CHOLEST/HDLC SERPL: 4.1 {RATIO} (ref 2–5)
CO2 SERPL-SCNC: 23 MMOL/L (ref 23–29)
CREAT SERPL-MCNC: 1.3 MG/DL (ref 0.5–1.4)
DIFFERENTIAL METHOD BLD: NORMAL
EOSINOPHIL # BLD AUTO: 0.4 K/UL (ref 0–0.5)
EOSINOPHIL NFR BLD: 4.5 % (ref 0–8)
ERYTHROCYTE [DISTWIDTH] IN BLOOD BY AUTOMATED COUNT: 13.1 % (ref 11.5–14.5)
EST. GFR  (NO RACE VARIABLE): 42 ML/MIN/1.73 M^2
ESTIMATED AVG GLUCOSE: 100 MG/DL (ref 68–131)
GLUCOSE SERPL-MCNC: 95 MG/DL (ref 70–110)
HBA1C MFR BLD: 5.1 % (ref 4–5.6)
HCT VFR BLD AUTO: 40.2 % (ref 37–48.5)
HDLC SERPL-MCNC: 46 MG/DL (ref 40–75)
HDLC SERPL: 24.2 % (ref 20–50)
HGB BLD-MCNC: 13.5 G/DL (ref 12–16)
IMM GRANULOCYTES # BLD AUTO: 0.03 K/UL (ref 0–0.04)
IMM GRANULOCYTES NFR BLD AUTO: 0.4 % (ref 0–0.5)
LDLC SERPL CALC-MCNC: 116 MG/DL (ref 63–159)
LYMPHOCYTES # BLD AUTO: 1.7 K/UL (ref 1–4.8)
LYMPHOCYTES NFR BLD: 20.6 % (ref 18–48)
MCH RBC QN AUTO: 29 PG (ref 27–31)
MCHC RBC AUTO-ENTMCNC: 33.6 G/DL (ref 32–36)
MCV RBC AUTO: 87 FL (ref 82–98)
MONOCYTES # BLD AUTO: 0.7 K/UL (ref 0.3–1)
MONOCYTES NFR BLD: 8.4 % (ref 4–15)
NEUTROPHILS # BLD AUTO: 5.3 K/UL (ref 1.8–7.7)
NEUTROPHILS NFR BLD: 65.4 % (ref 38–73)
NONHDLC SERPL-MCNC: 144 MG/DL
NRBC BLD-RTO: 0 /100 WBC
PLATELET # BLD AUTO: 219 K/UL (ref 150–450)
PMV BLD AUTO: 9.8 FL (ref 9.2–12.9)
POTASSIUM SERPL-SCNC: 3.9 MMOL/L (ref 3.5–5.1)
PROT SERPL-MCNC: 7 G/DL (ref 6–8.4)
PTH-INTACT SERPL-MCNC: 69.6 PG/ML (ref 9–77)
RBC # BLD AUTO: 4.65 M/UL (ref 4–5.4)
SODIUM SERPL-SCNC: 143 MMOL/L (ref 136–145)
T4 FREE SERPL-MCNC: 0.94 NG/DL (ref 0.71–1.51)
TRIGL SERPL-MCNC: 140 MG/DL (ref 30–150)
TSH SERPL DL<=0.005 MIU/L-ACNC: 18.24 UIU/ML (ref 0.4–4)
WBC # BLD AUTO: 8.06 K/UL (ref 3.9–12.7)

## 2024-04-11 PROCEDURE — 83036 HEMOGLOBIN GLYCOSYLATED A1C: CPT | Performed by: FAMILY MEDICINE

## 2024-04-11 PROCEDURE — 84443 ASSAY THYROID STIM HORMONE: CPT | Performed by: FAMILY MEDICINE

## 2024-04-11 PROCEDURE — 83970 ASSAY OF PARATHORMONE: CPT | Performed by: FAMILY MEDICINE

## 2024-04-11 PROCEDURE — 84439 ASSAY OF FREE THYROXINE: CPT | Performed by: FAMILY MEDICINE

## 2024-04-11 PROCEDURE — 80053 COMPREHEN METABOLIC PANEL: CPT | Performed by: FAMILY MEDICINE

## 2024-04-11 PROCEDURE — 80061 LIPID PANEL: CPT | Performed by: FAMILY MEDICINE

## 2024-04-11 PROCEDURE — 85025 COMPLETE CBC W/AUTO DIFF WBC: CPT | Performed by: FAMILY MEDICINE

## 2024-04-11 PROCEDURE — 36415 COLL VENOUS BLD VENIPUNCTURE: CPT | Performed by: FAMILY MEDICINE

## 2024-05-06 PROBLEM — N18.32 STAGE 3B CHRONIC KIDNEY DISEASE: Status: ACTIVE | Noted: 2024-05-06

## 2024-05-06 PROBLEM — N18.31 CHRONIC KIDNEY DISEASE, STAGE 3A: Status: RESOLVED | Noted: 2022-10-22 | Resolved: 2024-05-06

## 2024-06-14 ENCOUNTER — HOSPITAL ENCOUNTER (OUTPATIENT)
Dept: RADIOLOGY | Facility: HOSPITAL | Age: 79
Discharge: HOME | End: 2024-06-14
Attending: FAMILY MEDICINE

## 2024-06-14 DIAGNOSIS — Z12.31 BREAST CANCER SCREENING BY MAMMOGRAM: ICD-10-CM

## 2024-06-14 PROCEDURE — 77067 SCR MAMMO BI INCL CAD: CPT | Mod: TC

## 2024-10-28 ENCOUNTER — LAB VISIT (OUTPATIENT)
Dept: LAB | Facility: HOSPITAL | Age: 79
End: 2024-10-28
Attending: FAMILY MEDICINE
Payer: MEDICARE

## 2024-10-28 DIAGNOSIS — E03.9 HYPOTHYROIDISM, UNSPECIFIED TYPE: ICD-10-CM

## 2024-10-28 LAB
T3 SERPL-MCNC: 73 NG/DL (ref 60–180)
T4 FREE SERPL-MCNC: 0.92 NG/DL (ref 0.71–1.51)
TSH SERPL DL<=0.005 MIU/L-ACNC: 16.02 UIU/ML (ref 0.4–4)

## 2024-10-28 PROCEDURE — 36415 COLL VENOUS BLD VENIPUNCTURE: CPT | Performed by: FAMILY MEDICINE

## 2024-10-28 PROCEDURE — 84480 ASSAY TRIIODOTHYRONINE (T3): CPT | Performed by: FAMILY MEDICINE

## 2024-10-28 PROCEDURE — 84443 ASSAY THYROID STIM HORMONE: CPT | Performed by: FAMILY MEDICINE

## 2024-10-28 PROCEDURE — 84439 ASSAY OF FREE THYROXINE: CPT | Performed by: FAMILY MEDICINE

## 2024-12-19 ENCOUNTER — LAB VISIT (OUTPATIENT)
Dept: LAB | Facility: HOSPITAL | Age: 79
End: 2024-12-19
Attending: FAMILY MEDICINE
Payer: MEDICARE

## 2024-12-19 DIAGNOSIS — E03.9 HYPOTHYROIDISM, UNSPECIFIED TYPE: ICD-10-CM

## 2024-12-19 LAB
T4 FREE SERPL-MCNC: 1.79 NG/DL (ref 0.71–1.51)
TSH SERPL DL<=0.005 MIU/L-ACNC: 0.1 UIU/ML (ref 0.4–4)

## 2024-12-19 PROCEDURE — 84443 ASSAY THYROID STIM HORMONE: CPT | Performed by: FAMILY MEDICINE

## 2024-12-19 PROCEDURE — 84439 ASSAY OF FREE THYROXINE: CPT | Performed by: FAMILY MEDICINE

## 2024-12-19 PROCEDURE — 36415 COLL VENOUS BLD VENIPUNCTURE: CPT | Performed by: FAMILY MEDICINE

## 2025-02-13 ENCOUNTER — LAB VISIT (OUTPATIENT)
Dept: LAB | Facility: HOSPITAL | Age: 80
End: 2025-02-13
Attending: FAMILY MEDICINE
Payer: MEDICARE

## 2025-02-13 DIAGNOSIS — E03.9 HYPOTHYROIDISM, UNSPECIFIED TYPE: ICD-10-CM

## 2025-02-13 LAB — TSH SERPL DL<=0.005 MIU/L-ACNC: 0.47 UIU/ML (ref 0.4–4)

## 2025-02-13 PROCEDURE — 36415 COLL VENOUS BLD VENIPUNCTURE: CPT | Performed by: FAMILY MEDICINE

## 2025-02-13 PROCEDURE — 84443 ASSAY THYROID STIM HORMONE: CPT | Performed by: FAMILY MEDICINE

## 2025-04-23 ENCOUNTER — LAB VISIT (OUTPATIENT)
Dept: LAB | Facility: HOSPITAL | Age: 80
End: 2025-04-23
Attending: FAMILY MEDICINE
Payer: MEDICARE

## 2025-04-23 DIAGNOSIS — E03.9 HYPOTHYROIDISM, UNSPECIFIED TYPE: ICD-10-CM

## 2025-04-23 LAB — TSH SERPL-ACNC: 0.27 UIU/ML (ref 0.4–4)

## 2025-04-23 PROCEDURE — 84443 ASSAY THYROID STIM HORMONE: CPT

## 2025-04-23 PROCEDURE — 36415 COLL VENOUS BLD VENIPUNCTURE: CPT
